# Patient Record
Sex: FEMALE | Race: WHITE | NOT HISPANIC OR LATINO | Employment: OTHER | URBAN - METROPOLITAN AREA
[De-identification: names, ages, dates, MRNs, and addresses within clinical notes are randomized per-mention and may not be internally consistent; named-entity substitution may affect disease eponyms.]

---

## 2017-10-30 RX ORDER — FLUOXETINE 10 MG/1
10 CAPSULE ORAL DAILY
COMMUNITY

## 2017-10-30 RX ORDER — FENOFIBRATE 134 MG/1
134 CAPSULE ORAL
COMMUNITY
End: 2017-10-31

## 2017-10-30 RX ORDER — LEVOTHYROXINE SODIUM 88 UG/1
88 TABLET ORAL DAILY
COMMUNITY

## 2017-10-30 RX ORDER — LISINOPRIL 20 MG/1
20 TABLET ORAL DAILY
COMMUNITY
End: 2018-02-15 | Stop reason: HOSPADM

## 2017-10-30 RX ORDER — INSULIN GLARGINE 100 [IU]/ML
INJECTION, SOLUTION SUBCUTANEOUS DAILY
COMMUNITY
End: 2017-10-31

## 2017-10-30 RX ORDER — GLIMEPIRIDE 4 MG/1
4 TABLET ORAL
COMMUNITY
End: 2019-04-03

## 2017-10-30 RX ORDER — PRAVASTATIN SODIUM 40 MG
40 TABLET ORAL DAILY
COMMUNITY
End: 2018-02-15 | Stop reason: HOSPADM

## 2017-10-30 RX ORDER — METOPROLOL TARTRATE 50 MG/1
50 TABLET, FILM COATED ORAL 2 TIMES DAILY
COMMUNITY
End: 2017-10-31

## 2017-10-31 ENCOUNTER — PREP FOR SURGERY (OUTPATIENT)
Dept: OTHER | Facility: HOSPITAL | Age: 67
End: 2017-10-31

## 2017-10-31 ENCOUNTER — OFFICE VISIT (OUTPATIENT)
Dept: NEUROSURGERY | Facility: CLINIC | Age: 67
End: 2017-10-31

## 2017-10-31 VITALS — TEMPERATURE: 98.5 F | BODY MASS INDEX: 26.84 KG/M2 | HEIGHT: 66 IN | WEIGHT: 167 LBS

## 2017-10-31 DIAGNOSIS — M47.816 FACET ARTHRITIS OF LUMBAR REGION: ICD-10-CM

## 2017-10-31 DIAGNOSIS — M48.062 SPINAL STENOSIS OF LUMBAR REGION WITH NEUROGENIC CLAUDICATION: Primary | ICD-10-CM

## 2017-10-31 DIAGNOSIS — M51.36 BULGING LUMBAR DISC: ICD-10-CM

## 2017-10-31 DIAGNOSIS — M48.062 LUMBAR STENOSIS WITH NEUROGENIC CLAUDICATION: Primary | ICD-10-CM

## 2017-10-31 DIAGNOSIS — M51.36 DEGENERATIVE DISC DISEASE, LUMBAR: ICD-10-CM

## 2017-10-31 PROCEDURE — 99204 OFFICE O/P NEW MOD 45 MIN: CPT | Performed by: NEUROLOGICAL SURGERY

## 2017-10-31 RX ORDER — CHLORHEXIDINE GLUCONATE 4 G/100ML
SOLUTION TOPICAL
Qty: 120 ML | Refills: 0 | Status: ON HOLD | OUTPATIENT
Start: 2017-10-31 | End: 2017-11-24

## 2017-10-31 RX ORDER — EXEMESTANE 25 MG/1
25 TABLET ORAL DAILY
COMMUNITY
Start: 2017-10-12

## 2017-10-31 RX ORDER — AMITRIPTYLINE HYDROCHLORIDE 25 MG/1
25 TABLET, FILM COATED ORAL NIGHTLY
COMMUNITY
Start: 2017-09-26 | End: 2019-04-03

## 2017-10-31 RX ORDER — OXYCODONE HCL 10 MG/1
10 TABLET, FILM COATED, EXTENDED RELEASE ORAL ONCE
Status: CANCELLED | OUTPATIENT
Start: 2017-10-31 | End: 2017-10-31

## 2017-10-31 RX ORDER — SODIUM CHLORIDE 0.9 % (FLUSH) 0.9 %
1-10 SYRINGE (ML) INJECTION AS NEEDED
Status: CANCELLED | OUTPATIENT
Start: 2017-10-31

## 2017-10-31 RX ORDER — NAPROXEN SODIUM 220 MG
220 TABLET ORAL 2 TIMES DAILY PRN
COMMUNITY
End: 2019-04-03

## 2017-10-31 RX ORDER — GABAPENTIN 600 MG/1
600 TABLET ORAL DAILY
COMMUNITY
Start: 2017-09-15 | End: 2017-10-31

## 2017-10-31 RX ORDER — EMPAGLIFLOZIN 25 MG/1
25 TABLET, FILM COATED ORAL DAILY
COMMUNITY
Start: 2017-09-20 | End: 2019-04-03

## 2017-10-31 RX ORDER — ACETAMINOPHEN 500 MG
1000 TABLET ORAL ONCE
Status: CANCELLED | OUTPATIENT
Start: 2017-10-31 | End: 2017-10-31

## 2017-10-31 RX ORDER — FAMOTIDINE 20 MG/1
20 TABLET, FILM COATED ORAL
Status: CANCELLED | OUTPATIENT
Start: 2017-10-31

## 2017-10-31 RX ORDER — CEFAZOLIN SODIUM 2 G/100ML
2 INJECTION, SOLUTION INTRAVENOUS ONCE
Status: CANCELLED | OUTPATIENT
Start: 2017-10-31 | End: 2017-10-31

## 2017-10-31 RX ORDER — IBUPROFEN 800 MG/1
800 TABLET ORAL ONCE
Status: CANCELLED | OUTPATIENT
Start: 2017-10-31 | End: 2017-10-31

## 2017-10-31 RX ORDER — IBUPROFEN 400 MG/1
400 TABLET ORAL DAILY PRN
Status: ON HOLD | COMMUNITY
End: 2018-02-13

## 2017-10-31 NOTE — H&P
Patient: Malissa Child  : 1950     Primary Care Provider: Juan M Nieto MD     Requesting Provider: As above        History     Chief Complaint: Bilateral hip and leg pain with walking and standing intolerance.     History of Present Illness: Ms. Child is a 67-year-old retired recently  woman who is known to my service.  I saw her in  at which time she was noted to have a large right L5-S1 disc herniation.  She improved with nonoperative measures.  She now describes a six-month history of progressive pain involving her hips.  She has no low back pain.  She has pain extending into the top of both feet with there is also numbness.  She's done 5-7 physical therapy sessions.  Her balance is poor.  She's better with sitting or when using a heating pad.  She takes large amounts of Aleve and ibuprofen.  She is worse with walking.  In recent weeks to months she has noted weakness in both of her feet.  She has no bowel or bladder dysfunction.     Review of Systems   Constitutional: Negative for activity change, appetite change, chills, diaphoresis, fatigue, fever and unexpected weight change.   HENT: Negative for congestion, dental problem, drooling, ear discharge, ear pain, facial swelling, hearing loss, mouth sores, nosebleeds, postnasal drip, rhinorrhea, sinus pressure, sneezing, sore throat, tinnitus, trouble swallowing and voice change.    Eyes: Negative for photophobia, pain, discharge, redness, itching and visual disturbance.   Respiratory: Positive for apnea. Negative for cough, choking, chest tightness, shortness of breath, wheezing and stridor.    Cardiovascular: Positive for palpitations. Negative for chest pain and leg swelling.   Gastrointestinal: Negative for abdominal distention, abdominal pain, anal bleeding, blood in stool, constipation, diarrhea, nausea, rectal pain and vomiting.   Endocrine: Positive for polydipsia. Negative for cold intolerance, heat intolerance, polyphagia and  polyuria.   Genitourinary: Negative for decreased urine volume, difficulty urinating, dysuria, enuresis, flank pain, frequency, genital sores, hematuria and urgency.   Musculoskeletal: Positive for back pain and gait problem. Negative for arthralgias, joint swelling, myalgias, neck pain and neck stiffness.   Skin: Negative for color change, pallor, rash and wound.   Allergic/Immunologic: Positive for environmental allergies. Negative for food allergies and immunocompromised state.   Neurological: Positive for numbness. Negative for dizziness, tremors, seizures, syncope, facial asymmetry, speech difficulty, weakness, light-headedness and headaches.   Hematological: Negative for adenopathy. Does not bruise/bleed easily.   Psychiatric/Behavioral: Positive for dysphoric mood. Negative for agitation, behavioral problems, confusion, decreased concentration, hallucinations, self-injury, sleep disturbance and suicidal ideas. The patient is not nervous/anxious and is not hyperactive.          The patient's past medical history, past surgical history, family history, and social history have been reviewed at length in the electronic medical record.     Past Medical History:   Diagnosis Date   • Depression    • Diabetes mellitus    • Disease of thyroid gland    • History of breast cancer    • Hyperlipidemia    • Hypertension      Past Surgical History:   Procedure Laterality Date   • BREAST LUMPECTOMY Left 1995   • KNEE CARTILAGE SURGERY     • MASTECTOMY       Family History   Problem Relation Age of Onset   • Cancer Mother    • Cancer Father    • Cancer Brother      Social History     Social History   • Marital status:      Spouse name: N/A   • Number of children: N/A   • Years of education: N/A     Occupational History   • Not on file.     Social History Main Topics   • Smoking status: Never Smoker   • Smokeless tobacco: Never Used   • Alcohol use Yes   • Drug use: No   • Sexual activity: Not on file     Other Topics  "Concern   • Not on file     Social History Narrative     Allergies   Allergen Reactions   • Cat Hair Extract    • Dust Mite Extract    • Mold Extract [Trichophyton]        Physical Exam:   Temp 98.5 °F (36.9 °C)  Ht 66\" (167.6 cm)  Wt 167 lb (75.8 kg)  BMI 26.95 kg/m2  CONSTITUTIONAL: Patient is well-nourished, pleasant and appears stated age.  CV: Heart regular rate and rhythm without murmur, rub, or gallop.  Pedal pulses are present bilaterally.  PULMONARY: Lungs are clear to ascultation.  MUSCULOSKELETAL:  Straight leg raising is negative.  Jose's Sign is negative.  ROM in back normal.  Tenderness in the back to palpation is not observed.  NEUROLOGICAL:  Orientation, memory, attention span, language function, and cognition have been examined and are intact.  Strength is intact in the lower extremities to direct testing except for dorsiflexion of her feet which are both 4 minus/5.  Muscle tone is normal throughout.  Station and gait are normal except that she is unable to get up on her heels.  Sensation is intact to light touch testing throughout.  Deep tendon reflexes are 1+ and symmetrical.  Coordination is intact.  CRANIAL NERVES:  Cranial Nerve II:   Visual fields are full to confrontation.  Cranial Nerve III, IV, and VI: PERRLADC. Extraocular movements are intact.  Nystagmus is not present.  Cranial Nerve V: Facial sensation is intact to light touch.  Cranial Nerve VII: Muscles of facial expression demonstate no weakness or asymmetry.  Cranial Nerve VIII: Hearing is intact to finger rub bilaterally.  Cranial Nerve IX and X: Palate elevates symmetrically.  Cranial Nerve XI: Shoulder shrug is intact bilaterally.  Cranial Nerve XII: Tongue is midline without evidence of atrophy or fasciculation.     Medical Decision Making     Data Review:   MRI of the lumbar spine dated 10/6/17 demonstrates some mild diffuse degenerative disc disease and facet arthropathy.  There is generous stenosis particularly " involving the recesses at L4-5.     Diagnosis:   The patient harbors lumbar stenosis with neurogenic claudication.  She has bilateral radiculopathies characterized by pain and weakness.     Treatment Options:   Given her pain and neurologic deficit I recommended L4-5 decompressive laminectomy.  This will likely help with her pain and prevent progression of her weakness.  It may or may not correct her current neurologic difficulties.  The nature of the procedure as well as the potential risks, complications, limitations, and alternatives to the procedure were discussed at length with the patient and the patient has agreed to proceed with surgery.          Diagnosis Plan   1. Spinal stenosis of lumbar region with neurogenic claudication      2. Bulging lumbar disc      3. Degenerative disc disease, lumbar      4. Facet arthritis of lumbar region

## 2017-10-31 NOTE — PROGRESS NOTES
Patient: Malissa Child  : 1950    Primary Care Provider: Juan M Nieto MD    Requesting Provider: As above      History    Chief Complaint: Bilateral hip and leg pain with walking and standing intolerance.    History of Present Illness: Ms. Child is a 67-year-old retired recently  woman who is known to my service.  I saw her in  at which time she was noted to have a large right L5-S1 disc herniation.  She improved with nonoperative measures.  She now describes a six-month history of progressive pain involving her hips.  She has no low back pain.  She has pain extending into the top of both feet with there is also numbness.  She's done 5-7 physical therapy sessions.  Her balance is poor.  She's better with sitting or when using a heating pad.  She takes large amounts of Aleve and ibuprofen.  She is worse with walking.  In recent weeks to months she has noted weakness in both of her feet.  She has no bowel or bladder dysfunction.    Review of Systems   Constitutional: Negative for activity change, appetite change, chills, diaphoresis, fatigue, fever and unexpected weight change.   HENT: Negative for congestion, dental problem, drooling, ear discharge, ear pain, facial swelling, hearing loss, mouth sores, nosebleeds, postnasal drip, rhinorrhea, sinus pressure, sneezing, sore throat, tinnitus, trouble swallowing and voice change.    Eyes: Negative for photophobia, pain, discharge, redness, itching and visual disturbance.   Respiratory: Positive for apnea. Negative for cough, choking, chest tightness, shortness of breath, wheezing and stridor.    Cardiovascular: Positive for palpitations. Negative for chest pain and leg swelling.   Gastrointestinal: Negative for abdominal distention, abdominal pain, anal bleeding, blood in stool, constipation, diarrhea, nausea, rectal pain and vomiting.   Endocrine: Positive for polydipsia. Negative for cold intolerance, heat intolerance, polyphagia and  "polyuria.   Genitourinary: Negative for decreased urine volume, difficulty urinating, dysuria, enuresis, flank pain, frequency, genital sores, hematuria and urgency.   Musculoskeletal: Positive for back pain and gait problem. Negative for arthralgias, joint swelling, myalgias, neck pain and neck stiffness.   Skin: Negative for color change, pallor, rash and wound.   Allergic/Immunologic: Positive for environmental allergies. Negative for food allergies and immunocompromised state.   Neurological: Positive for numbness. Negative for dizziness, tremors, seizures, syncope, facial asymmetry, speech difficulty, weakness, light-headedness and headaches.   Hematological: Negative for adenopathy. Does not bruise/bleed easily.   Psychiatric/Behavioral: Positive for dysphoric mood. Negative for agitation, behavioral problems, confusion, decreased concentration, hallucinations, self-injury, sleep disturbance and suicidal ideas. The patient is not nervous/anxious and is not hyperactive.        The patient's past medical history, past surgical history, family history, and social history have been reviewed at length in the electronic medical record.    Physical Exam:   Temp 98.5 °F (36.9 °C)  Ht 66\" (167.6 cm)  Wt 167 lb (75.8 kg)  BMI 26.95 kg/m2  CONSTITUTIONAL: Patient is well-nourished, pleasant and appears stated age.  CV: Heart regular rate and rhythm without murmur, rub, or gallop.  Pedal pulses are present bilaterally.  PULMONARY: Lungs are clear to ascultation.  MUSCULOSKELETAL:  Straight leg raising is negative.  Jose's Sign is negative.  ROM in back normal.  Tenderness in the back to palpation is not observed.  NEUROLOGICAL:  Orientation, memory, attention span, language function, and cognition have been examined and are intact.  Strength is intact in the lower extremities to direct testing except for dorsiflexion of her feet which are both 4 minus/5.  Muscle tone is normal throughout.  Station and gait are " normal except that she is unable to get up on her heels.  Sensation is intact to light touch testing throughout.  Deep tendon reflexes are 1+ and symmetrical.  Coordination is intact.  CRANIAL NERVES:  Cranial Nerve II:   Visual fields are full to confrontation.  Cranial Nerve III, IV, and VI: PERRLADC. Extraocular movements are intact.  Nystagmus is not present.  Cranial Nerve V: Facial sensation is intact to light touch.  Cranial Nerve VII: Muscles of facial expression demonstate no weakness or asymmetry.  Cranial Nerve VIII: Hearing is intact to finger rub bilaterally.  Cranial Nerve IX and X: Palate elevates symmetrically.  Cranial Nerve XI: Shoulder shrug is intact bilaterally.  Cranial Nerve XII: Tongue is midline without evidence of atrophy or fasciculation.    Medical Decision Making    Data Review:   MRI of the lumbar spine dated 10/6/17 demonstrates some mild diffuse degenerative disc disease and facet arthropathy.  There is generous stenosis particularly involving the recesses at L4-5.    Diagnosis:   The patient harbors lumbar stenosis with neurogenic claudication.  She has bilateral radiculopathies characterized by pain and weakness.    Treatment Options:   Given her pain and neurologic deficit I recommended L4-5 decompressive laminectomy.  This will likely help with her pain and prevent progression of her weakness.  It may or may not correct her current neurologic difficulties.  The nature of the procedure as well as the potential risks, complications, limitations, and alternatives to the procedure were discussed at length with the patient and the patient has agreed to proceed with surgery.       Diagnosis Plan   1. Spinal stenosis of lumbar region with neurogenic claudication     2. Bulging lumbar disc     3. Degenerative disc disease, lumbar     4. Facet arthritis of lumbar region         Scribed for Arnold Del Rio MD by Christianne Manning CMA on 10/31/2017 at 12:34 PM    IDr. Del Rio,  personally performed the services described in the documentation, as scribed in my presence, and it is both accurate and complete.

## 2017-11-01 PROBLEM — M48.062 LUMBAR STENOSIS WITH NEUROGENIC CLAUDICATION: Status: ACTIVE | Noted: 2017-11-01

## 2017-11-17 ENCOUNTER — APPOINTMENT (OUTPATIENT)
Dept: PREADMISSION TESTING | Facility: HOSPITAL | Age: 67
End: 2017-11-17

## 2017-11-19 ENCOUNTER — APPOINTMENT (OUTPATIENT)
Dept: PREADMISSION TESTING | Facility: HOSPITAL | Age: 67
End: 2017-11-19

## 2017-11-19 VITALS — BODY MASS INDEX: 26.79 KG/M2 | HEIGHT: 66 IN | WEIGHT: 166.67 LBS

## 2017-11-19 DIAGNOSIS — M48.062 LUMBAR STENOSIS WITH NEUROGENIC CLAUDICATION: ICD-10-CM

## 2017-11-19 LAB
DEPRECATED RDW RBC AUTO: 40.8 FL (ref 37–54)
ERYTHROCYTE [DISTWIDTH] IN BLOOD BY AUTOMATED COUNT: 12.8 % (ref 11.3–14.5)
HBA1C MFR BLD: 11.1 % (ref 4.8–5.6)
HCT VFR BLD AUTO: 46.1 % (ref 34.5–44)
HGB BLD-MCNC: 16.2 G/DL (ref 11.5–15.5)
MCH RBC QN AUTO: 30.6 PG (ref 27–31)
MCHC RBC AUTO-ENTMCNC: 35.1 G/DL (ref 32–36)
MCV RBC AUTO: 87.1 FL (ref 80–99)
PLATELET # BLD AUTO: 363 10*3/MM3 (ref 150–450)
PMV BLD AUTO: 10.5 FL (ref 6–12)
POTASSIUM BLD-SCNC: 4.4 MMOL/L (ref 3.5–5.5)
RBC # BLD AUTO: 5.29 10*6/MM3 (ref 3.89–5.14)
WBC NRBC COR # BLD: 10.57 10*3/MM3 (ref 3.5–10.8)

## 2017-11-19 PROCEDURE — 87081 CULTURE SCREEN ONLY: CPT | Performed by: NEUROLOGICAL SURGERY

## 2017-11-19 PROCEDURE — 85027 COMPLETE CBC AUTOMATED: CPT | Performed by: ANESTHESIOLOGY

## 2017-11-19 PROCEDURE — 84132 ASSAY OF SERUM POTASSIUM: CPT | Performed by: ANESTHESIOLOGY

## 2017-11-19 PROCEDURE — 93005 ELECTROCARDIOGRAM TRACING: CPT

## 2017-11-19 PROCEDURE — 83036 HEMOGLOBIN GLYCOSYLATED A1C: CPT | Performed by: ANESTHESIOLOGY

## 2017-11-19 PROCEDURE — 93010 ELECTROCARDIOGRAM REPORT: CPT | Performed by: INTERNAL MEDICINE

## 2017-11-19 PROCEDURE — 36415 COLL VENOUS BLD VENIPUNCTURE: CPT

## 2017-11-19 NOTE — DISCHARGE INSTRUCTIONS
The following information and instructions were given:    NPO after MN except sips of water with routine prescribed medication (except blood thinner, diabetes, or weight reducing medication) unless otherwise instructed by your physician.  Do not eat, drink, smoke or chew gum after MN the night before surgery. This also includes no mints.    DO NOT shave for two days before your procedure.  Do not wear makeup.      DO NOT wear fingernail polish (gel/regular) and/or acrylic/artificial nails on the day of surgery.   If a patient had recent manicure and would rather not remove polish or artificial nails, then the minimum requirement is that the polish/artificial nails must be removed from the middle finger on each hand.      If patient was having surgery on an upper extremity, then the patient was instructed that fingernail polish/artificial fingernails must be removed for surgery.  NO EXCEPTIONS.      If patient was having surgery on a lower extremity, then the patient was instructed that toenail polish on both extremities must be removed for surgery.  NO EXCEPTIONS.    Remove all jewelry (advised to go to jeweler if unable to remove).  Jewelry especially rings can no longer be taped for surgery.    Leave anything you consider valuable at home.    Leave your suitcase in the car until after your surgery.    Bring the following with you (if applicable)   -picture ID and insurance cards   -Co-pay/deductible required by insurance   -Medications in the original bottles (not a list) including all over-the-counter  medications if not brought to PAT   -Copy of advance directive, living will or power of  documents if not  brought to PAT   -CPAP or BIPAP mask and tubing (do not bring machine)   -Skin prep instructions sheet   -PAT Pass    Education booklet, brochure, handout or binder given to patient.    Pain Control After Surgery handout given to patient.    Respirex use (handout given to patient) and pneumonia  prevention.    Signs and Symptoms of infection.    DVT Prevention stressing the importance of ambulation.    Patient to apply Chlorhexadine wipes to surgical area (as instructed) the night before procedure and the AM of procedure.      Lumbar book given

## 2017-11-21 LAB — MRSA SPEC QL CULT: NORMAL

## 2017-11-24 ENCOUNTER — ANESTHESIA (OUTPATIENT)
Dept: PERIOP | Facility: HOSPITAL | Age: 67
End: 2017-11-24

## 2017-11-24 ENCOUNTER — HOSPITAL ENCOUNTER (OUTPATIENT)
Facility: HOSPITAL | Age: 67
Discharge: HOME | End: 2017-11-25
Attending: NEUROLOGICAL SURGERY | Admitting: NEUROLOGICAL SURGERY

## 2017-11-24 ENCOUNTER — ANESTHESIA EVENT (OUTPATIENT)
Dept: PERIOP | Facility: HOSPITAL | Age: 67
End: 2017-11-24

## 2017-11-24 ENCOUNTER — APPOINTMENT (OUTPATIENT)
Dept: GENERAL RADIOLOGY | Facility: HOSPITAL | Age: 67
End: 2017-11-24

## 2017-11-24 DIAGNOSIS — M48.062 LUMBAR STENOSIS WITH NEUROGENIC CLAUDICATION: ICD-10-CM

## 2017-11-24 LAB
GLUCOSE BLDC GLUCOMTR-MCNC: 254 MG/DL (ref 70–130)
GLUCOSE BLDC GLUCOMTR-MCNC: 256 MG/DL (ref 70–130)
POTASSIUM BLDA-SCNC: 4.09 MMOL/L (ref 3.5–5.3)

## 2017-11-24 PROCEDURE — 25010000002 ONDANSETRON PER 1 MG: Performed by: NEUROLOGICAL SURGERY

## 2017-11-24 PROCEDURE — A9270 NON-COVERED ITEM OR SERVICE: HCPCS | Performed by: ANESTHESIOLOGY

## 2017-11-24 PROCEDURE — 63710000001 METFORMIN 1000 MG TABLET: Performed by: NEUROLOGICAL SURGERY

## 2017-11-24 PROCEDURE — 25010000002 FENTANYL CITRATE (PF) 100 MCG/2ML SOLUTION: Performed by: NURSE ANESTHETIST, CERTIFIED REGISTERED

## 2017-11-24 PROCEDURE — A9270 NON-COVERED ITEM OR SERVICE: HCPCS | Performed by: NEUROLOGICAL SURGERY

## 2017-11-24 PROCEDURE — 63710000001 LISINOPRIL 20 MG TABLET: Performed by: NEUROLOGICAL SURGERY

## 2017-11-24 PROCEDURE — 25010000002 DEXAMETHASONE PER 1 MG: Performed by: NURSE ANESTHETIST, CERTIFIED REGISTERED

## 2017-11-24 PROCEDURE — 76000 FLUOROSCOPY <1 HR PHYS/QHP: CPT

## 2017-11-24 PROCEDURE — 84132 ASSAY OF SERUM POTASSIUM: CPT | Performed by: ANESTHESIOLOGY

## 2017-11-24 PROCEDURE — 25010000002 HYDROMORPHONE PER 4 MG: Performed by: NURSE ANESTHETIST, CERTIFIED REGISTERED

## 2017-11-24 PROCEDURE — 25010000003 CEFAZOLIN IN DEXTROSE 2-4 GM/100ML-% SOLUTION: Performed by: NEUROLOGICAL SURGERY

## 2017-11-24 PROCEDURE — 63710000001 FLUOXETINE 10 MG CAPSULE: Performed by: NEUROLOGICAL SURGERY

## 2017-11-24 PROCEDURE — 25010000002 PROPOFOL 10 MG/ML EMULSION: Performed by: NURSE ANESTHETIST, CERTIFIED REGISTERED

## 2017-11-24 PROCEDURE — 63710000001 INSULIN LISPRO (HUMAN) PER 5 UNITS

## 2017-11-24 PROCEDURE — 63710000001 ATORVASTATIN 10 MG TABLET: Performed by: NEUROLOGICAL SURGERY

## 2017-11-24 PROCEDURE — 63710000001 HYDROCODONE-ACETAMINOPHEN 7.5-325 MG TABLET: Performed by: NEUROLOGICAL SURGERY

## 2017-11-24 PROCEDURE — 82962 GLUCOSE BLOOD TEST: CPT

## 2017-11-24 PROCEDURE — 25010000002 PHENYLEPHRINE PER 1 ML: Performed by: NURSE ANESTHETIST, CERTIFIED REGISTERED

## 2017-11-24 PROCEDURE — A9270 NON-COVERED ITEM OR SERVICE: HCPCS

## 2017-11-24 PROCEDURE — 25010000002 NEOSTIGMINE 10 MG/10ML SOLUTION: Performed by: NURSE ANESTHETIST, CERTIFIED REGISTERED

## 2017-11-24 PROCEDURE — 63710000001 IBUPROFEN 800 MG TABLET: Performed by: NEUROLOGICAL SURGERY

## 2017-11-24 PROCEDURE — 63047 LAM FACETEC & FORAMOT LUMBAR: CPT | Performed by: NEUROLOGICAL SURGERY

## 2017-11-24 PROCEDURE — 63710000001 FAMOTIDINE 20 MG TABLET: Performed by: ANESTHESIOLOGY

## 2017-11-24 PROCEDURE — 63710000001 OXYCODONE 10 MG TABLET EXTENDED-RELEASE 12 HOUR: Performed by: NEUROLOGICAL SURGERY

## 2017-11-24 PROCEDURE — 25010000002 ONDANSETRON PER 1 MG: Performed by: NURSE ANESTHETIST, CERTIFIED REGISTERED

## 2017-11-24 PROCEDURE — 63710000001 ACETAMINOPHEN 500 MG TABLET: Performed by: NEUROLOGICAL SURGERY

## 2017-11-24 PROCEDURE — 63710000001 AMITRIPTYLINE 25 MG TABLET: Performed by: NEUROLOGICAL SURGERY

## 2017-11-24 RX ORDER — FENTANYL CITRATE 50 UG/ML
50 INJECTION, SOLUTION INTRAMUSCULAR; INTRAVENOUS
Status: DISCONTINUED | OUTPATIENT
Start: 2017-11-24 | End: 2017-11-24 | Stop reason: HOSPADM

## 2017-11-24 RX ORDER — LIDOCAINE HYDROCHLORIDE 10 MG/ML
0.5 INJECTION, SOLUTION EPIDURAL; INFILTRATION; INTRACAUDAL; PERINEURAL ONCE AS NEEDED
Status: COMPLETED | OUTPATIENT
Start: 2017-11-24 | End: 2017-11-24

## 2017-11-24 RX ORDER — SODIUM CHLORIDE 0.9 % (FLUSH) 0.9 %
1-10 SYRINGE (ML) INJECTION AS NEEDED
Status: DISCONTINUED | OUTPATIENT
Start: 2017-11-24 | End: 2017-11-24 | Stop reason: HOSPADM

## 2017-11-24 RX ORDER — DOCUSATE SODIUM 100 MG/1
100 CAPSULE, LIQUID FILLED ORAL 2 TIMES DAILY PRN
Status: DISCONTINUED | OUTPATIENT
Start: 2017-11-24 | End: 2017-11-25 | Stop reason: HOSPADM

## 2017-11-24 RX ORDER — CEFAZOLIN SODIUM 2 G/100ML
2 INJECTION, SOLUTION INTRAVENOUS EVERY 8 HOURS
Status: COMPLETED | OUTPATIENT
Start: 2017-11-24 | End: 2017-11-25

## 2017-11-24 RX ORDER — HYDROMORPHONE HYDROCHLORIDE 1 MG/ML
0.5 INJECTION, SOLUTION INTRAMUSCULAR; INTRAVENOUS; SUBCUTANEOUS
Status: DISCONTINUED | OUTPATIENT
Start: 2017-11-24 | End: 2017-11-24 | Stop reason: HOSPADM

## 2017-11-24 RX ORDER — BUPIVACAINE HYDROCHLORIDE AND EPINEPHRINE 2.5; 5 MG/ML; UG/ML
INJECTION, SOLUTION EPIDURAL; INFILTRATION; INTRACAUDAL; PERINEURAL AS NEEDED
Status: DISCONTINUED | OUTPATIENT
Start: 2017-11-24 | End: 2017-11-24 | Stop reason: HOSPADM

## 2017-11-24 RX ORDER — PROMETHAZINE HYDROCHLORIDE 25 MG/ML
12.5 INJECTION, SOLUTION INTRAMUSCULAR; INTRAVENOUS ONCE AS NEEDED
Status: DISCONTINUED | OUTPATIENT
Start: 2017-11-24 | End: 2017-11-24 | Stop reason: HOSPADM

## 2017-11-24 RX ORDER — OXYCODONE HCL 10 MG/1
10 TABLET, FILM COATED, EXTENDED RELEASE ORAL ONCE
Status: COMPLETED | OUTPATIENT
Start: 2017-11-24 | End: 2017-11-24

## 2017-11-24 RX ORDER — SODIUM CHLORIDE 0.9 % (FLUSH) 0.9 %
1-10 SYRINGE (ML) INJECTION AS NEEDED
Status: DISCONTINUED | OUTPATIENT
Start: 2017-11-24 | End: 2017-11-25 | Stop reason: HOSPADM

## 2017-11-24 RX ORDER — HYDROCODONE BITARTRATE AND ACETAMINOPHEN 7.5; 325 MG/1; MG/1
1 TABLET ORAL EVERY 4 HOURS PRN
Status: DISCONTINUED | OUTPATIENT
Start: 2017-11-24 | End: 2017-11-25 | Stop reason: HOSPADM

## 2017-11-24 RX ORDER — IBUPROFEN 400 MG/1
400 TABLET ORAL DAILY PRN
Status: DISCONTINUED | OUTPATIENT
Start: 2017-11-24 | End: 2017-11-25 | Stop reason: HOSPADM

## 2017-11-24 RX ORDER — LISINOPRIL 20 MG/1
20 TABLET ORAL DAILY
Status: DISCONTINUED | OUTPATIENT
Start: 2017-11-24 | End: 2017-11-25 | Stop reason: HOSPADM

## 2017-11-24 RX ORDER — LEVOTHYROXINE SODIUM 88 UG/1
88 TABLET ORAL
Status: DISCONTINUED | OUTPATIENT
Start: 2017-11-25 | End: 2017-11-25 | Stop reason: HOSPADM

## 2017-11-24 RX ORDER — GLIPIZIDE 5 MG/1
2.5 TABLET ORAL
Status: DISCONTINUED | OUTPATIENT
Start: 2017-11-25 | End: 2017-11-25 | Stop reason: HOSPADM

## 2017-11-24 RX ORDER — FLUOXETINE 10 MG/1
10 CAPSULE ORAL DAILY
Status: DISCONTINUED | OUTPATIENT
Start: 2017-11-24 | End: 2017-11-25 | Stop reason: HOSPADM

## 2017-11-24 RX ORDER — DIPHENHYDRAMINE HCL 25 MG
25 CAPSULE ORAL NIGHTLY PRN
Status: DISCONTINUED | OUTPATIENT
Start: 2017-11-24 | End: 2017-11-25 | Stop reason: HOSPADM

## 2017-11-24 RX ORDER — MORPHINE SULFATE 4 MG/ML
2 INJECTION, SOLUTION INTRAMUSCULAR; INTRAVENOUS EVERY 4 HOURS PRN
Status: DISCONTINUED | OUTPATIENT
Start: 2017-11-24 | End: 2017-11-25 | Stop reason: HOSPADM

## 2017-11-24 RX ORDER — MAGNESIUM HYDROXIDE 1200 MG/15ML
LIQUID ORAL AS NEEDED
Status: DISCONTINUED | OUTPATIENT
Start: 2017-11-24 | End: 2017-11-24 | Stop reason: HOSPADM

## 2017-11-24 RX ORDER — CEFAZOLIN SODIUM 2 G/100ML
2 INJECTION, SOLUTION INTRAVENOUS ONCE
Status: COMPLETED | OUTPATIENT
Start: 2017-11-24 | End: 2017-11-24

## 2017-11-24 RX ORDER — MORPHINE SULFATE 4 MG/ML
4 INJECTION, SOLUTION INTRAMUSCULAR; INTRAVENOUS EVERY 4 HOURS PRN
Status: DISCONTINUED | OUTPATIENT
Start: 2017-11-24 | End: 2017-11-25 | Stop reason: HOSPADM

## 2017-11-24 RX ORDER — SODIUM CHLORIDE, SODIUM LACTATE, POTASSIUM CHLORIDE, CALCIUM CHLORIDE 600; 310; 30; 20 MG/100ML; MG/100ML; MG/100ML; MG/100ML
90 INJECTION, SOLUTION INTRAVENOUS CONTINUOUS
Status: DISCONTINUED | OUTPATIENT
Start: 2017-11-24 | End: 2017-11-25

## 2017-11-24 RX ORDER — SENNA AND DOCUSATE SODIUM 50; 8.6 MG/1; MG/1
1 TABLET, FILM COATED ORAL NIGHTLY PRN
Status: DISCONTINUED | OUTPATIENT
Start: 2017-11-24 | End: 2017-11-25 | Stop reason: HOSPADM

## 2017-11-24 RX ORDER — DEXAMETHASONE SODIUM PHOSPHATE 4 MG/ML
INJECTION, SOLUTION INTRA-ARTICULAR; INTRALESIONAL; INTRAMUSCULAR; INTRAVENOUS; SOFT TISSUE AS NEEDED
Status: DISCONTINUED | OUTPATIENT
Start: 2017-11-24 | End: 2017-11-24 | Stop reason: SURG

## 2017-11-24 RX ORDER — FAMOTIDINE 20 MG/1
20 TABLET, FILM COATED ORAL ONCE
Status: COMPLETED | OUTPATIENT
Start: 2017-11-24 | End: 2017-11-24

## 2017-11-24 RX ORDER — ACETAMINOPHEN 325 MG/1
650 TABLET ORAL EVERY 4 HOURS PRN
Status: DISCONTINUED | OUTPATIENT
Start: 2017-11-24 | End: 2017-11-25 | Stop reason: HOSPADM

## 2017-11-24 RX ORDER — PROMETHAZINE HYDROCHLORIDE 25 MG/1
25 SUPPOSITORY RECTAL ONCE AS NEEDED
Status: DISCONTINUED | OUTPATIENT
Start: 2017-11-24 | End: 2017-11-24 | Stop reason: HOSPADM

## 2017-11-24 RX ORDER — IBUPROFEN 800 MG/1
800 TABLET ORAL ONCE
Status: COMPLETED | OUTPATIENT
Start: 2017-11-24 | End: 2017-11-24

## 2017-11-24 RX ORDER — ACETAMINOPHEN 500 MG
1000 TABLET ORAL ONCE
Status: COMPLETED | OUTPATIENT
Start: 2017-11-24 | End: 2017-11-24

## 2017-11-24 RX ORDER — NALOXONE HCL 0.4 MG/ML
0.4 VIAL (ML) INJECTION
Status: DISCONTINUED | OUTPATIENT
Start: 2017-11-24 | End: 2017-11-25 | Stop reason: HOSPADM

## 2017-11-24 RX ORDER — ONDANSETRON 2 MG/ML
4 INJECTION INTRAMUSCULAR; INTRAVENOUS ONCE AS NEEDED
Status: DISCONTINUED | OUTPATIENT
Start: 2017-11-24 | End: 2017-11-24 | Stop reason: HOSPADM

## 2017-11-24 RX ORDER — ATRACURIUM BESYLATE 10 MG/ML
INJECTION, SOLUTION INTRAVENOUS AS NEEDED
Status: DISCONTINUED | OUTPATIENT
Start: 2017-11-24 | End: 2017-11-24 | Stop reason: SURG

## 2017-11-24 RX ORDER — ONDANSETRON 2 MG/ML
4 INJECTION INTRAMUSCULAR; INTRAVENOUS EVERY 6 HOURS PRN
Status: DISCONTINUED | OUTPATIENT
Start: 2017-11-24 | End: 2017-11-25 | Stop reason: HOSPADM

## 2017-11-24 RX ORDER — NEOSTIGMINE METHYLSULFATE 1 MG/ML
INJECTION, SOLUTION INTRAVENOUS AS NEEDED
Status: DISCONTINUED | OUTPATIENT
Start: 2017-11-24 | End: 2017-11-24 | Stop reason: SURG

## 2017-11-24 RX ORDER — AMITRIPTYLINE HYDROCHLORIDE 25 MG/1
25 TABLET, FILM COATED ORAL NIGHTLY
Status: DISCONTINUED | OUTPATIENT
Start: 2017-11-24 | End: 2017-11-25 | Stop reason: HOSPADM

## 2017-11-24 RX ORDER — FENTANYL CITRATE 50 UG/ML
INJECTION, SOLUTION INTRAMUSCULAR; INTRAVENOUS AS NEEDED
Status: DISCONTINUED | OUTPATIENT
Start: 2017-11-24 | End: 2017-11-24 | Stop reason: SURG

## 2017-11-24 RX ORDER — LIDOCAINE HYDROCHLORIDE 10 MG/ML
INJECTION, SOLUTION EPIDURAL; INFILTRATION; INTRACAUDAL; PERINEURAL AS NEEDED
Status: DISCONTINUED | OUTPATIENT
Start: 2017-11-24 | End: 2017-11-24 | Stop reason: SURG

## 2017-11-24 RX ORDER — BISACODYL 5 MG/1
5 TABLET, DELAYED RELEASE ORAL DAILY PRN
Status: DISCONTINUED | OUTPATIENT
Start: 2017-11-24 | End: 2017-11-25 | Stop reason: HOSPADM

## 2017-11-24 RX ORDER — GLYCOPYRROLATE 0.2 MG/ML
INJECTION INTRAMUSCULAR; INTRAVENOUS AS NEEDED
Status: DISCONTINUED | OUTPATIENT
Start: 2017-11-24 | End: 2017-11-24 | Stop reason: SURG

## 2017-11-24 RX ORDER — ATORVASTATIN CALCIUM 10 MG/1
10 TABLET, FILM COATED ORAL NIGHTLY
Status: DISCONTINUED | OUTPATIENT
Start: 2017-11-24 | End: 2017-11-25 | Stop reason: HOSPADM

## 2017-11-24 RX ORDER — FAMOTIDINE 10 MG/ML
20 INJECTION, SOLUTION INTRAVENOUS ONCE
Status: CANCELLED | OUTPATIENT
Start: 2017-11-24 | End: 2017-11-24

## 2017-11-24 RX ORDER — PROMETHAZINE HYDROCHLORIDE 25 MG/1
25 TABLET ORAL ONCE AS NEEDED
Status: DISCONTINUED | OUTPATIENT
Start: 2017-11-24 | End: 2017-11-24 | Stop reason: HOSPADM

## 2017-11-24 RX ORDER — BISACODYL 10 MG
10 SUPPOSITORY, RECTAL RECTAL DAILY PRN
Status: DISCONTINUED | OUTPATIENT
Start: 2017-11-24 | End: 2017-11-25 | Stop reason: HOSPADM

## 2017-11-24 RX ORDER — ONDANSETRON 2 MG/ML
INJECTION INTRAMUSCULAR; INTRAVENOUS AS NEEDED
Status: DISCONTINUED | OUTPATIENT
Start: 2017-11-24 | End: 2017-11-24 | Stop reason: SURG

## 2017-11-24 RX ORDER — PROPOFOL 10 MG/ML
VIAL (ML) INTRAVENOUS AS NEEDED
Status: DISCONTINUED | OUTPATIENT
Start: 2017-11-24 | End: 2017-11-24 | Stop reason: SURG

## 2017-11-24 RX ORDER — SODIUM CHLORIDE, SODIUM LACTATE, POTASSIUM CHLORIDE, CALCIUM CHLORIDE 600; 310; 30; 20 MG/100ML; MG/100ML; MG/100ML; MG/100ML
9 INJECTION, SOLUTION INTRAVENOUS CONTINUOUS
Status: DISCONTINUED | OUTPATIENT
Start: 2017-11-24 | End: 2017-11-25 | Stop reason: HOSPADM

## 2017-11-24 RX ADMIN — LISINOPRIL 20 MG: 20 TABLET ORAL at 17:44

## 2017-11-24 RX ADMIN — CEFAZOLIN SODIUM 2 G: 2 INJECTION, SOLUTION INTRAVENOUS at 11:21

## 2017-11-24 RX ADMIN — ONDANSETRON 4 MG: 2 INJECTION INTRAMUSCULAR; INTRAVENOUS at 12:11

## 2017-11-24 RX ADMIN — PROPOFOL 160 MG: 10 INJECTION, EMULSION INTRAVENOUS at 11:29

## 2017-11-24 RX ADMIN — IBUPROFEN 800 MG: 800 TABLET ORAL at 10:45

## 2017-11-24 RX ADMIN — LIDOCAINE HYDROCHLORIDE 0.2 ML: 10 INJECTION, SOLUTION EPIDURAL; INFILTRATION; INTRACAUDAL; PERINEURAL at 10:32

## 2017-11-24 RX ADMIN — NEOSTIGMINE METHYLSULFATE 3.5 MG: 1 INJECTION, SOLUTION INTRAVENOUS at 12:19

## 2017-11-24 RX ADMIN — SODIUM CHLORIDE, POTASSIUM CHLORIDE, SODIUM LACTATE AND CALCIUM CHLORIDE 9 ML/HR: 600; 310; 30; 20 INJECTION, SOLUTION INTRAVENOUS at 10:32

## 2017-11-24 RX ADMIN — OXYCODONE HYDROCHLORIDE 10 MG: 10 TABLET, FILM COATED, EXTENDED RELEASE ORAL at 10:45

## 2017-11-24 RX ADMIN — AMITRIPTYLINE HYDROCHLORIDE 25 MG: 25 TABLET, FILM COATED ORAL at 20:34

## 2017-11-24 RX ADMIN — FLUOXETINE 10 MG: 10 CAPSULE ORAL at 17:45

## 2017-11-24 RX ADMIN — LIDOCAINE HYDROCHLORIDE 50 MG: 10 INJECTION, SOLUTION EPIDURAL; INFILTRATION; INTRACAUDAL; PERINEURAL at 11:29

## 2017-11-24 RX ADMIN — FENTANYL CITRATE 150 MCG: 50 INJECTION, SOLUTION INTRAMUSCULAR; INTRAVENOUS at 11:29

## 2017-11-24 RX ADMIN — HYDROMORPHONE HYDROCHLORIDE 0.5 MG: 1 INJECTION, SOLUTION INTRAMUSCULAR; INTRAVENOUS; SUBCUTANEOUS at 13:25

## 2017-11-24 RX ADMIN — DEXAMETHASONE SODIUM PHOSPHATE 8 MG: 4 INJECTION, SOLUTION INTRAMUSCULAR; INTRAVENOUS at 11:35

## 2017-11-24 RX ADMIN — FENTANYL CITRATE 50 MCG: 50 INJECTION, SOLUTION INTRAMUSCULAR; INTRAVENOUS at 11:35

## 2017-11-24 RX ADMIN — HYDROCODONE BITARTRATE AND ACETAMINOPHEN 1 TABLET: 7.5; 325 TABLET ORAL at 20:34

## 2017-11-24 RX ADMIN — CEFAZOLIN SODIUM 2 G: 2 INJECTION, SOLUTION INTRAVENOUS at 20:34

## 2017-11-24 RX ADMIN — HYDROMORPHONE HYDROCHLORIDE 0.5 MG: 1 INJECTION, SOLUTION INTRAMUSCULAR; INTRAVENOUS; SUBCUTANEOUS at 12:55

## 2017-11-24 RX ADMIN — ONDANSETRON 4 MG: 2 INJECTION INTRAMUSCULAR; INTRAVENOUS at 18:17

## 2017-11-24 RX ADMIN — METFORMIN HYDROCHLORIDE 1000 MG: 1000 TABLET, FILM COATED ORAL at 17:45

## 2017-11-24 RX ADMIN — PHENYLEPHRINE HYDROCHLORIDE 100 MCG: 10 INJECTION INTRAVENOUS at 12:05

## 2017-11-24 RX ADMIN — EPHEDRINE SULFATE 10 MG: 50 INJECTION INTRAMUSCULAR; INTRAVENOUS; SUBCUTANEOUS at 12:02

## 2017-11-24 RX ADMIN — ATRACURIUM BESYLATE 50 MG: 10 INJECTION, SOLUTION INTRAVENOUS at 11:29

## 2017-11-24 RX ADMIN — ATORVASTATIN CALCIUM 10 MG: 10 TABLET, FILM COATED ORAL at 20:34

## 2017-11-24 RX ADMIN — HYDROMORPHONE HYDROCHLORIDE 0.5 MG: 1 INJECTION, SOLUTION INTRAMUSCULAR; INTRAVENOUS; SUBCUTANEOUS at 13:44

## 2017-11-24 RX ADMIN — FAMOTIDINE 20 MG: 20 TABLET, FILM COATED ORAL at 10:45

## 2017-11-24 RX ADMIN — HYDROCODONE BITARTRATE AND ACETAMINOPHEN 1 TABLET: 7.5; 325 TABLET ORAL at 14:51

## 2017-11-24 RX ADMIN — GLYCOPYRROLATE 0.4 MG: 0.2 INJECTION, SOLUTION INTRAMUSCULAR; INTRAVENOUS at 12:19

## 2017-11-24 RX ADMIN — EPHEDRINE SULFATE 15 MG: 50 INJECTION INTRAMUSCULAR; INTRAVENOUS; SUBCUTANEOUS at 11:55

## 2017-11-24 RX ADMIN — ACETAMINOPHEN 1000 MG: 500 TABLET ORAL at 10:45

## 2017-11-24 RX ADMIN — SODIUM CHLORIDE, POTASSIUM CHLORIDE, SODIUM LACTATE AND CALCIUM CHLORIDE 90 ML/HR: 600; 310; 30; 20 INJECTION, SOLUTION INTRAVENOUS at 18:39

## 2017-11-24 RX ADMIN — SODIUM CHLORIDE, POTASSIUM CHLORIDE, SODIUM LACTATE AND CALCIUM CHLORIDE: 600; 310; 30; 20 INJECTION, SOLUTION INTRAVENOUS at 11:21

## 2017-11-24 NOTE — ANESTHESIA POSTPROCEDURE EVALUATION
Patient: Malissa Danyell    Procedure Summary     Date Anesthesia Start Anesthesia Stop Room / Location    11/24/17 1121   [facility] / [facility]       Procedure Diagnosis Surgeon Provider    LUMBAR LAMINECTOMY L4-5 (N/A Spine Lumbar) Lumbar stenosis with neurogenic claudication  (Lumbar stenosis with neurogenic claudication [M48.062]) MD Kenny Hollis MD          Anesthesia Type: general  Last vitals  /85    98.5   Pulse   101   Resp   18   SpO2   98     Anesthesia Post Evaluation

## 2017-11-24 NOTE — OP NOTE
NEUROSURGICAL OPERATIVE NOTE        PREOPERATIVE DIAGNOSIS:    L4-5 stenosis  Neurogenic claudication      POSTOPERATIVE DIAGNOSIS:  Same      PROCEDURE:  L4-5 laminectomy with medial facetectomies and foraminotomies      SURGEON:  Arnold Del Rio M.D.      ASSISTANT:  Eileen Carson PA-C      ANESTHESIA:  General      ESTIMATED BLOOD LOSS:  Less than 25 mL      SPECIMEN:  None      DRAINS:  7 flat NAA      COMPLICATIONS:  None      CLINICAL NOTE:  The patient is a 67-year-old woman with a history of back and bilateral lower extremity pain with walking and standing intolerance.  Studies demonstrate generous stenosis at L4-5.  As such, she presents at this time for decompressive laminectomy.  The nature of the procedure as well as the potential risks, complications, limitations, and alternatives to the procedure were discussed at length with the patient and the patient has agreed to proceed with surgery.    TECHNICAL NOTE:  The patient was brought to the operating room and while on her cart she underwent general endotracheal was achieved. She was then turned prone onto the Cloward saddle frame. Special care was ensured to protect pressure points. Her low back was prepared and draped in usual fashion. A localizing radiograph was obtained using the C-arm with a spinal needle in the lumbosacral midline. Based on this a several centimeter vertical incision was fashioned. Underlying tissues were divided with cautery to provide exposure to the posterior spinal elements at L4-L5. Another radiographic confirmed the operative level. Leksell rongeur was utilized to remove the spinous process at L4 and the upper aspect of L5. The drill was utilized to pass the central trough which was then widened using drill and Kerrison punches. There was dramatic ligamentous overgrowth, not as much bony overgrowth. All of this was resected. At completion of the procedure, the dural sac was well decompressed and a blunt ball probe could easily  be passed along the L4 and L5 nerve roots. Bleeding points were controlled with bone wax and with Floseal. With a Valsalva maneuver at the end of procedure there was no significant bleeding, nor evidence of CSF leak. The wound was washed out with an antibiotic-containing solution. The paraspinous muscle and fascia was then reapproximated in interrupted fashion with 0 Vicryl suture. Then, 0.25% Marcaine was instilled in the paraspinous musculature and subcutaneous tissue. Subcutaneous tissues were closed in layers with 3-0 Vicryl suture. The skin was closed in a running subcuticular fashion with Stratafix. A Prineo dressing was applied. The patient was subsequently rolled onto her cart, extubated, and taken to recovery room in satisfactory condition. There were no intraoperative complications and she did receive preoperative antibiotics.            Arnold Del Rio M.D.

## 2017-11-24 NOTE — INTERVAL H&P NOTE
Pre-Op H&P (See Recent Office Note Attached)    Chief complaint: Bilateral hip and leg pain with walking and standing intolerance    Review of Systems:  General ROS:  no fever, chills, rashes, No change since last office visit  Cardiovascular ROS: no chest pain or dyspnea on exertion  Respiratory ROS: no cough, shortness of breath, or wheezing  Skin:  Ulceration close to right elbow with no drainage, occ warmth, edema, erythema.  Improved.    Immunization History:   Influenza:  Yes 2017  Pneumococcal:  no  Tetanus:  unknown    Vital Signs:  /98 (BP Location: Right arm, Patient Position: Lying)  Pulse 102  Temp 97.1 °F (36.2 °C) (Temporal Artery )   Resp 16  SpO2 98%  Breastfeeding? No    Physical Exam:    CV:  S1S2 regular rate and rhythm, no murmur               Resp:  Clear to auscultation; respirations regular, even and unlabored               Skin:  Right elbow area ulceration with mild edema, minimal warmth and erythema.  No drainage/crepitus    Results Review:    I reviewed the patient's new clinical results.    Cancer Staging (if applicable)  Cancer Patient: __ yes _x_no __unknown; If yes, clinical stage T:__ N:__M:__, stage group or __N/A    Kadi Shaw, APRN  11/24/2017   11:13 AM

## 2017-11-25 VITALS
TEMPERATURE: 97.4 F | BODY MASS INDEX: 26.78 KG/M2 | OXYGEN SATURATION: 96 % | DIASTOLIC BLOOD PRESSURE: 70 MMHG | SYSTOLIC BLOOD PRESSURE: 145 MMHG | RESPIRATION RATE: 20 BRPM | HEIGHT: 66 IN | HEART RATE: 92 BPM | WEIGHT: 166.63 LBS

## 2017-11-25 PROBLEM — M48.062 LUMBAR STENOSIS WITH NEUROGENIC CLAUDICATION: Status: ACTIVE | Noted: 2017-11-01

## 2017-11-25 PROCEDURE — 25010000003 CEFAZOLIN IN DEXTROSE 2-4 GM/100ML-% SOLUTION: Performed by: NEUROLOGICAL SURGERY

## 2017-11-25 PROCEDURE — A9270 NON-COVERED ITEM OR SERVICE: HCPCS | Performed by: NEUROLOGICAL SURGERY

## 2017-11-25 PROCEDURE — 63710000001 FLUOXETINE 10 MG CAPSULE: Performed by: NEUROLOGICAL SURGERY

## 2017-11-25 PROCEDURE — 63710000001 METFORMIN 1000 MG TABLET: Performed by: NEUROLOGICAL SURGERY

## 2017-11-25 PROCEDURE — 63710000001 LEVOTHYROXINE 88 MCG TABLET: Performed by: NEUROLOGICAL SURGERY

## 2017-11-25 PROCEDURE — 63710000001 LISINOPRIL 20 MG TABLET: Performed by: NEUROLOGICAL SURGERY

## 2017-11-25 PROCEDURE — 63710000001 GLIPIZIDE 5 MG TABLET: Performed by: NEUROLOGICAL SURGERY

## 2017-11-25 PROCEDURE — 63710000001 DOCUSATE SODIUM 100 MG CAPSULE: Performed by: NEUROLOGICAL SURGERY

## 2017-11-25 PROCEDURE — 63710000001 HYDROCODONE-ACETAMINOPHEN 7.5-325 MG TABLET: Performed by: NEUROLOGICAL SURGERY

## 2017-11-25 PROCEDURE — 63710000001 IBUPROFEN 400 MG TABLET: Performed by: NEUROLOGICAL SURGERY

## 2017-11-25 RX ORDER — HYDROCODONE BITARTRATE AND ACETAMINOPHEN 7.5; 325 MG/1; MG/1
1 TABLET ORAL 3 TIMES DAILY PRN
Qty: 50 TABLET | Refills: 0 | Status: SHIPPED | OUTPATIENT
Start: 2017-11-25 | End: 2018-01-26

## 2017-11-25 RX ADMIN — GLIPIZIDE 2.5 MG: 5 TABLET ORAL at 07:50

## 2017-11-25 RX ADMIN — FLUOXETINE 10 MG: 10 CAPSULE ORAL at 07:52

## 2017-11-25 RX ADMIN — LEVOTHYROXINE SODIUM 88 MCG: 88 TABLET ORAL at 05:43

## 2017-11-25 RX ADMIN — DOCUSATE SODIUM 100 MG: 100 CAPSULE, LIQUID FILLED ORAL at 07:53

## 2017-11-25 RX ADMIN — HYDROCODONE BITARTRATE AND ACETAMINOPHEN 1 TABLET: 7.5; 325 TABLET ORAL at 07:52

## 2017-11-25 RX ADMIN — HYDROCODONE BITARTRATE AND ACETAMINOPHEN 1 TABLET: 7.5; 325 TABLET ORAL at 13:07

## 2017-11-25 RX ADMIN — LISINOPRIL 20 MG: 20 TABLET ORAL at 07:52

## 2017-11-25 RX ADMIN — IBUPROFEN 400 MG: 400 TABLET ORAL at 07:51

## 2017-11-25 RX ADMIN — METFORMIN HYDROCHLORIDE 1000 MG: 1000 TABLET, FILM COATED ORAL at 07:53

## 2017-11-25 RX ADMIN — CEFAZOLIN SODIUM 2 G: 2 INJECTION, SOLUTION INTRAVENOUS at 04:11

## 2017-11-25 NOTE — PLAN OF CARE
Problem: Laminectomy/Foraminotomy/Discectomy (Adult)  Goal: Signs and Symptoms of Listed Potential Problems Will be Absent or Manageable (Laminectomy/Foraminotomy/Discectomy)  Outcome: Ongoing (interventions implemented as appropriate)  Patient is learning how to be independent with new mobility limitations

## 2017-11-25 NOTE — DISCHARGE INSTR - APPOINTMENTS
Follow up with Dr. Del Rio's physician assistant in 3 weeks. Call on Monday as the office is closed on the weekends.

## 2017-11-25 NOTE — PROGRESS NOTES
Continued Stay Note   [place]     Patient Name: Malissa Child  MRN: [id number]  Today's Date: 11/25/2017    Admit Date: 11/24/2017          Discharge Plan       11/25/17 [facility]    Case Management/Social Work Plan    Plan Home    Patient/Family In Agreement With Plan yes    Additional Comments Spoke with patient at bedside regarding discharge plan.  Patient requesting bedside commode to go over her toilet at home due to how low it is.  Patient request Rolling Walker for stability.   [facility].  Called [facility] and spoke with Christy who will have DME delivered to patients room prior to discharge.  Patient verbalizes no other discharge needs at this time.  Patient plans to discharge home today via car with family to transport.                Discharge Codes     None        Expected Discharge Date and Time     Expected Discharge Date Expected Discharge Time    Nov 25, 2017             Yusra Vega, RN

## 2017-11-25 NOTE — PLAN OF CARE
Problem: Patient Care Overview (Adult)  Goal: Plan of Care Review  Outcome: Ongoing (interventions implemented as appropriate)  Goal: Adult Individualization and Mutuality  Outcome: Ongoing (interventions implemented as appropriate)    Problem: Perioperative Period (Adult)  Goal: Signs and Symptoms of Listed Potential Problems Will be Absent or Manageable (Perioperative Period)  Outcome: Ongoing (interventions implemented as appropriate)

## 2017-11-25 NOTE — PLAN OF CARE
Problem: Patient Care Overview (Adult)  Goal: Plan of Care Review  Outcome: Ongoing (interventions implemented as appropriate)    11/25/17 0332   Coping/Psychosocial Response Interventions   Plan Of Care Reviewed With patient   Patient Care Overview   Progress progress toward functional goals as expected   Outcome Evaluation   Outcome Summary/Follow up Plan Pt appears to have slept for most of the 7p shift. NAA drain without problems. Up to BSC x 1 assist. Pt A&O. No problems noted. VSS         Problem: Perioperative Period (Adult)  Goal: Signs and Symptoms of Listed Potential Problems Will be Absent or Manageable (Perioperative Period)  Outcome: Ongoing (interventions implemented as appropriate)    Problem: Laminectomy/Foraminotomy/Discectomy (Adult)  Goal: Signs and Symptoms of Listed Potential Problems Will be Absent or Manageable (Laminectomy/Foraminotomy/Discectomy)  Outcome: Ongoing (interventions implemented as appropriate)

## 2017-11-25 NOTE — PROGRESS NOTES
NEUROSURGERY PROGRESS NOTE     LOS: 0 days   Patient Care Team:  Juan M Nieto MD as PCP - General  Juan M Nieto MD as Referring Physician (Family Medicine)  Eric Neves MD as Consulting Physician (Hematology and Oncology)    Chief Complaint: Bilateral hip and leg pain with walking and standing intolerance.    POD#: 1 Day Post-Op  Procedures:  L4-5 laminectomy.    Interval History:   Patient Complaints: Incisional pain and nausea which has improved.  Patient Denies: Weakness, numbness, or voiding difficulty.    Vital Signs: Blood pressure 116/66, pulse 94, temperature 97.6 °F (36.4 °C), temperature source Oral, resp. rate 20, SpO2 95 %, not currently breastfeeding.  Intake/Output:   Intake/Output Summary (Last 24 hours) at 11/25/17 0805  Last data filed at 11/25/17 0541   Gross per 24 hour   Intake           2223.4 ml   Output             2390 ml   Net           -166.6 ml     Drain output: 70 mL.    Physical Exam:  The patient is awake, alert, and in good spirits.  Her incision is intact and looks quite good.  Motor and sensory function are intact in her lower extremities.     Assessment/Plan:  1.  Lumbar stenosis status post decompression at L4-5.  2.  Disposition: Drain out this morning.  Mobilize.  Discharge home early afternoon today.      Arnold Del Rio MD  11/25/17  8:05 AM

## 2017-12-05 ENCOUNTER — TELEPHONE (OUTPATIENT)
Dept: NEUROSURGERY | Facility: CLINIC | Age: 67
End: 2017-12-05

## 2017-12-05 NOTE — TELEPHONE ENCOUNTER
Provider:  Quang  Caller: Malissa  Time of call:   1113am  Phone #:  [phone]  Surgery:  Lumbar Lami L4-5  Surgery Date:  11/24/17  Last visit:   10/31/17  Next visit: 12/15/17      Reason for call:       Pt wants to know if she is cleared to drive? She states she is only taking her pain medication in the evening.

## 2017-12-11 ENCOUNTER — TELEPHONE (OUTPATIENT)
Dept: NEUROSURGERY | Facility: CLINIC | Age: 67
End: 2017-12-11

## 2017-12-11 NOTE — TELEPHONE ENCOUNTER
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 Vaseline or Vit E for the incision.

## 2017-12-11 NOTE — TELEPHONE ENCOUNTER
Provider: Quang   Caller: jing   Time of call: [id number]     Phone #:  [phone]  Surgery:  LUMBAR LAMINECTOMY L4-5  Surgery Date:11/24/17    Last visit: same      Next visit: 12/15/17         Reason for call: Pt understands that she may drive short distances and would like to know if she is clear to  herself to her appointment on Friday, she lives in [place]. Also, she states that her wound has healed up but it is dry and itchy. She would like to know if there is anything she may put on it to help relieve the itching.

## 2017-12-15 ENCOUNTER — OFFICE VISIT (OUTPATIENT)
Dept: NEUROSURGERY | Facility: CLINIC | Age: 67
End: 2017-12-15

## 2017-12-15 VITALS
TEMPERATURE: 98.5 F | WEIGHT: 162 LBS | BODY MASS INDEX: 26.03 KG/M2 | DIASTOLIC BLOOD PRESSURE: 90 MMHG | SYSTOLIC BLOOD PRESSURE: 138 MMHG | OXYGEN SATURATION: 99 % | HEIGHT: 66 IN | HEART RATE: 99 BPM

## 2017-12-15 DIAGNOSIS — R26.89 BALANCE PROBLEMS: ICD-10-CM

## 2017-12-15 DIAGNOSIS — R26.81 GAIT INSTABILITY: ICD-10-CM

## 2017-12-15 DIAGNOSIS — M48.062 LUMBAR STENOSIS WITH NEUROGENIC CLAUDICATION: Primary | ICD-10-CM

## 2017-12-15 DIAGNOSIS — Z98.890 S/P LUMBAR LAMINECTOMY: ICD-10-CM

## 2017-12-15 PROCEDURE — 99024 POSTOP FOLLOW-UP VISIT: CPT | Performed by: PHYSICIAN ASSISTANT

## 2017-12-15 RX ORDER — DICLOFENAC SODIUM 75 MG/1
TABLET, DELAYED RELEASE ORAL
Status: ON HOLD | COMMUNITY
Start: 2017-11-09 | End: 2018-02-13

## 2017-12-15 NOTE — PROGRESS NOTES
[facility]    Patient: Malissa Child      Date: 12/15/17  : 1950   MRN: [id number]  ______________________________________________________________________      CHIEF COMPLIANT:   Bilateral hip and leg pain with walking and standing intolerance    HISTORY OF PRESENT ILLNESS  Ms. Child is a 67 y.o. retired former [employer] and recently  woman who presents today for postop follow up.   Pre-operatively, she presented with progressive pain in her hips, pain and numbness extending into the top of both feet, and poor balance.  Studies demonstrated generous stenosis at L4-5.  She was noted to have a large right L5-S1 disc herniation which improved with nonoperative measures back in .  On 17, she underwent L4-5 laminectomy with no overt intraoperative complications.    Today, she reports improvement of her symptoms.  Her back and bilateral lower extremity pain have improved.  Her walking and standing intolerance are slowly improving as well.  She is now able to get on her tippy toes.  Although improved, she does continue to struggle with her gait and balance.   Her incision is healing nicely without wound drainage, redness, swelling, or pain at incision site.  No fevers, chills, malaise, new pain, new weakness, headaches, B/B changes or saddle anesthesia.      The patient's past medical history, past surgical history, family history, and social history have been reviewed at length in the electronic medical record.      Past Medical History:   Diagnosis Date   • Arthritis    • Back pain    • Breast cancer    • Depression    • Diabetes mellitus     dx - does not check fsbs   • Disease of thyroid gland    • History of breast cancer    • Hyperlipidemia    • Hypertension    • Sleep apnea with use of continuous positive airway pressure (CPAP)    • Wears eyeglasses    • Wears partial dentures      Past Surgical History:   Procedure Laterality  Date   • APPENDECTOMY     • BREAST LUMPECTOMY Left 1995   • COLONOSCOPY      > 5 years ago   • KNEE CARTILAGE SURGERY Left    • LUMBAR LAMINECTOMY DISCECTOMY DECOMPRESSION N/A 11/24/2017    Procedure: LUMBAR LAMINECTOMY L4-5;  Surgeon: Arnold Del Rio MD;  Location: [facility];  Service:    • MASTECTOMY Left      Social History     Social History   • Marital status:      Spouse name: N/A   • Number of children: N/A   • Years of education: N/A     Occupational History   • Not on file.     Social History Main Topics   • Smoking status: Never Smoker   • Smokeless tobacco: Never Used   • Alcohol use Yes      Comment: occasion   • Drug use: No   • Sexual activity: Defer     Other Topics Concern   • Not on file     Social History Narrative     Ms. Child has a current medication list which includes the following prescription(s): amitriptyline, diclofenac, exemestane, fluoxetine, glimepiride, hydrocodone-acetaminophen, ibuprofen, jardiance, levothyroxine, lisinopril, metformin, naproxen sodium, and pravastatin. See EMR for details.  She is allergic to cat hair extract; dust mite extract; and mold extract [trichophyton].      Review of Systems   Constitutional: Negative for activity change, appetite change, chills, diaphoresis, fatigue, fever and unexpected weight change.   HENT: Negative for congestion, dental problem, drooling, ear discharge, ear pain, facial swelling, hearing loss, mouth sores, nosebleeds, postnasal drip, rhinorrhea, sinus pressure, sneezing, sore throat, tinnitus, trouble swallowing and voice change.    Eyes: Negative for photophobia, pain, discharge, redness, itching and visual disturbance.   Respiratory: Negative for apnea, cough, choking, chest tightness, shortness of breath, wheezing and stridor.    Cardiovascular: Negative for chest pain, palpitations and leg swelling.   Gastrointestinal: Negative for abdominal distention, abdominal pain, anal bleeding, blood in stool, constipation,  "diarrhea, nausea, rectal pain and vomiting.   Endocrine: Negative for cold intolerance, heat intolerance, polydipsia, polyphagia and polyuria.   Genitourinary: Negative for decreased urine volume, difficulty urinating, dysuria, enuresis, flank pain, frequency, genital sores, hematuria and urgency.   Musculoskeletal: Positive for back pain and gait problem. Negative for arthralgias, joint swelling, myalgias, neck pain and neck stiffness.   Skin: Negative for color change, pallor, rash and wound.   Allergic/Immunologic: Negative for environmental allergies, food allergies and immunocompromised state.   Neurological: Negative for dizziness, tremors, seizures, syncope, facial asymmetry, speech difficulty, weakness, light-headedness, numbness and headaches.   Hematological: Negative for adenopathy. Does not bruise/bleed easily.   Psychiatric/Behavioral: Negative for agitation, behavioral problems, confusion, decreased concentration, dysphoric mood, hallucinations, self-injury, sleep disturbance and suicidal ideas. The patient is not nervous/anxious and is not hyperactive.    All other systems reviewed and are negative.       OBJECTIVE  Physical Exam  Vitals: Blood pressure 138/90, pulse 99, temperature 98.5 °F (36.9 °C), temperature source Temporal Artery , height 167.6 cm (65.98\"), weight 73.5 kg (162 lb), SpO2 99 %, not currently breastfeeding.  Body mass index is 26.16 kg/(m^2).   General Appearance:   WDWN, alert, and cooperative. NAD.   HEENT: NCAT   Chest Breathing unlabored.   Mood: Appropriate mood and affect.   Higher Integrative Fx: AAOx3. Speech intact.     Skin: Skin incision is well approximated at appropriate stage of healing without warmth, drainage, or erythema.        Medical Decision Making    ASSESSMENT  1. Lumbar stenosis with neurogenic claudication S/P lumbar laminectomy   2. Gait and balance dysfunction    DISCUSSION  Ms. Child is stable and doing well status post L4-5 laminectomy.  Her incision " is healing well with no s/s of infection.   I have ordered therapy to work with her to strengthen her gait and improved balance.    Reviewed activity recommendations, home exercises, and wound care with patient today. She should continue to advance activity as tolerated, limit repetitive bending, lifting, and twisting of the lower back, and limit vigorous activities.  Continue supportive measures as needed.      Return for f/up with Dr. Del Rio, in 6 weeks. She should call or RTC sooner if symptoms worsen or new symptoms develop.     Orders Placed This Encounter   Procedures   • Ambulatory Referral to Physical Therapy Evaluate and treat       Eileen Carson PA-C  [facility]  12/15/17    ______________________________________________  Patient Care Team:  Juan M Nieto MD as PCP - General  Juan M Nieto MD as Referring Physician (Family Medicine)  Eric Neves MD as Consulting Physician (Hematology and Oncology)

## 2018-01-02 ENCOUNTER — TELEPHONE (OUTPATIENT)
Dept: NEUROSURGERY | Facility: CLINIC | Age: 68
End: 2018-01-02

## 2018-01-02 NOTE — TELEPHONE ENCOUNTER
Provider: Quang   Caller: jing   Time of call: 435    Phone #:  [phone]  Surgery:  LUMBAR LAMINECTOMY L4-5  Surgery Date:11/24/17    Last visit: 12/15/17      Next visit: 1/26/18     Reason for call:         Pt left message stating that she fell on a tile floor on her right side last Wednesday. She went to the local hospital where they performed a CT scan and apparently she is fine. She reports improvement, she only experiences pain on her right side (not in back) and only during the night. FYI unless we have inquiries or concerns.

## 2018-01-03 NOTE — TELEPHONE ENCOUNTER
Patient called again this morning to tell us about her falling. She states that she is in pain and wants to know what to do.   I called her back to get more information but had to leave a message.

## 2018-01-04 NOTE — TELEPHONE ENCOUNTER
2nd attempt to contact pt: went straight to , left detailed message requesting that she call us back. No alternate numbers

## 2018-01-04 NOTE — TELEPHONE ENCOUNTER
Pt states that she has had no improvement since her fall last week. She has seen her PCP this morning because the pain is significant and he prescribed her Flexeril 10mg TID which she is only taking half (5mg) and Norco 7.5 which she has not begun taking. She denied any fever or chills or excess drainage from incision. Her pain begins inferior to her her incision and runs down her biceps femoris and usually terminates at the popliteal region but can sometimes radiate to her ankle. She reports numbness in both toes however she was able to perform bilateral plantar flexion during her examination today with her PCP. Weakness is also noted in her right leg.

## 2018-01-11 NOTE — TELEPHONE ENCOUNTER
SW patient and she said that she fell a couple of times on Sunday and went to the ER when she got home she fell again.  She has been in contact with her PCP who has scheduled her an MRI from, patient thinks, 1/28.  She didn't want to schedule an appointment since she feels like she needs scans prior to coming in.  Please advise on how to proceed.

## 2018-01-26 ENCOUNTER — OFFICE VISIT (OUTPATIENT)
Dept: NEUROSURGERY | Facility: CLINIC | Age: 68
End: 2018-01-26

## 2018-01-26 VITALS — TEMPERATURE: 97.5 F | WEIGHT: 159 LBS | BODY MASS INDEX: 25.55 KG/M2 | HEIGHT: 66 IN

## 2018-01-26 DIAGNOSIS — M51.36 BULGING LUMBAR DISC: ICD-10-CM

## 2018-01-26 DIAGNOSIS — R26.81 GAIT INSTABILITY: ICD-10-CM

## 2018-01-26 DIAGNOSIS — R26.89 BALANCE PROBLEMS: ICD-10-CM

## 2018-01-26 DIAGNOSIS — Z98.890 S/P LUMBAR LAMINECTOMY: Primary | ICD-10-CM

## 2018-01-26 DIAGNOSIS — M47.816 FACET ARTHRITIS OF LUMBAR REGION: ICD-10-CM

## 2018-01-26 DIAGNOSIS — M48.062 SPINAL STENOSIS OF LUMBAR REGION WITH NEUROGENIC CLAUDICATION: ICD-10-CM

## 2018-01-26 DIAGNOSIS — M51.36 DEGENERATIVE DISC DISEASE, LUMBAR: ICD-10-CM

## 2018-01-26 PROCEDURE — 99024 POSTOP FOLLOW-UP VISIT: CPT | Performed by: NEUROLOGICAL SURGERY

## 2018-01-26 RX ORDER — HYDROCODONE BITARTRATE AND ACETAMINOPHEN 5; 325 MG/1; MG/1
TABLET ORAL
COMMUNITY
Start: 2017-12-30 | End: 2019-04-03

## 2018-01-26 NOTE — PROGRESS NOTES
Patient: Malissa Child  : 1950    Primary Care Provider: Juan M Nieto MD    Requesting Provider: As above        History    Chief Complaint: Right leg weakness and numbness.    History of Present Illness: Ms. Child is a 67-year-old former [employer] who is known in the past to have a right L5-S1 disc herniation that was managed nonoperatively in .  More recently she presented with progressive pain in her hips with numbness extending into both feet.  Her balance was noted to be poor.  On 17 she underwent L4-5 laminectomy.  When seen last in December she was doing better.  She now describes a 2-3 week history of some numbness and weakness in her right leg.  She reports falling.  On occasion she has some back and leg pain.    Review of Systems   Constitutional: Negative for activity change, appetite change, chills, diaphoresis, fatigue, fever and unexpected weight change.   HENT: Negative for congestion, dental problem, drooling, ear discharge, ear pain, facial swelling, hearing loss, mouth sores, nosebleeds, postnasal drip, rhinorrhea, sinus pressure, sneezing, sore throat, tinnitus, trouble swallowing and voice change.    Eyes: Negative for photophobia, pain, discharge, redness, itching and visual disturbance.   Respiratory: Negative for apnea, cough, choking, chest tightness, shortness of breath, wheezing and stridor.    Cardiovascular: Negative for chest pain, palpitations and leg swelling.   Gastrointestinal: Negative for abdominal distention, abdominal pain, anal bleeding, blood in stool, constipation, diarrhea, nausea, rectal pain and vomiting.   Endocrine: Negative for cold intolerance, heat intolerance, polydipsia, polyphagia and polyuria.   Genitourinary: Negative for decreased urine volume, difficulty urinating, dysuria, enuresis, flank pain, frequency, genital sores, hematuria and urgency.   Musculoskeletal: Positive for back pain and gait problem. Negative for arthralgias, joint  "swelling, myalgias, neck pain and neck stiffness.   Skin: Negative for color change, pallor, rash and wound.   Allergic/Immunologic: Negative for environmental allergies, food allergies and immunocompromised state.   Neurological: Negative for dizziness, tremors, seizures, syncope, facial asymmetry, speech difficulty, weakness, light-headedness, numbness and headaches.   Hematological: Negative for adenopathy. Does not bruise/bleed easily.   Psychiatric/Behavioral: Negative for agitation, behavioral problems, confusion, decreased concentration, dysphoric mood, hallucinations, self-injury, sleep disturbance and suicidal ideas. The patient is not nervous/anxious and is not hyperactive.    All other systems reviewed and are negative.      The patient's past medical history, past surgical history, family history, and social history have been reviewed at length in the electronic medical record.    Physical Exam:   Temp 97.5 °F (36.4 °C) (Temporal Artery )   Ht 167.6 cm (65.98\")  Wt 72.1 kg (159 lb)  BMI 25.68 kg/m2  The patient relates independently but there may be some subtle weakness in dorsiflexion of her right foot even when walking.  Sensation is diminished light touch testing the right lateral calf.  Dorsiflexion of the foot on the right is 4 minus/5.    Medical Decision Making    Data Review:   Follow-up MRI demonstrates good decompression at L4-5.  There is some broad-based spurring or disc bulging rightward toward the recess and proximal foramen at L4-5.  There is a small disc protrusion in the proximal foramen on the right at L5-S1.  These disc findings were present on her preoperative study.  The quality of her most recent lumbar study is quite limited.    Diagnosis:   Lumbar stenosis status post decompression.    Treatment Options:   Certainly some of the patient's current complaints could emanate from her back.  We could also be dealing with more of a peripheral nerve mediated process.  She's going to " attend physical therapy for the next few weeks and then follow up in our clinic.  If she continues to have significant issues with her right leg then we will check electrodiagnostic studies and arrange follow-up.       Diagnosis Plan   1. S/P lumbar laminectomy     2. Gait instability     3. Balance problems     4. Spinal stenosis of lumbar region with neurogenic claudication     5. Bulging lumbar disc     6. Degenerative disc disease, lumbar     7. Facet arthritis of lumbar region       Scribed for Arnold Del Rio MD by Christianne Manning CMA on 01/26/2018 at 12:56 PM    I, Dr. Del Rio, personally performed the services described in the documentation, as scribed in my presence, and it is both accurate and complete.

## 2018-02-13 ENCOUNTER — APPOINTMENT (OUTPATIENT)
Dept: MRI IMAGING | Facility: HOSPITAL | Age: 68
End: 2018-02-13

## 2018-02-13 ENCOUNTER — APPOINTMENT (OUTPATIENT)
Dept: GENERAL RADIOLOGY | Facility: HOSPITAL | Age: 68
End: 2018-02-13

## 2018-02-13 ENCOUNTER — APPOINTMENT (OUTPATIENT)
Dept: CT IMAGING | Facility: HOSPITAL | Age: 68
End: 2018-02-13

## 2018-02-13 ENCOUNTER — HOSPITAL ENCOUNTER (INPATIENT)
Facility: HOSPITAL | Age: 68
LOS: 2 days | Discharge: NURSING, REHAB OR LONG-TERM CARE | End: 2018-02-15
Attending: EMERGENCY MEDICINE | Admitting: INTERNAL MEDICINE

## 2018-02-13 DIAGNOSIS — Z78.9 IMPAIRED MOBILITY AND ADLS: ICD-10-CM

## 2018-02-13 DIAGNOSIS — M48.062 LUMBAR STENOSIS WITH NEUROGENIC CLAUDICATION: ICD-10-CM

## 2018-02-13 DIAGNOSIS — G45.9 TRANSIENT CEREBRAL ISCHEMIA, UNSPECIFIED TYPE: Primary | ICD-10-CM

## 2018-02-13 DIAGNOSIS — E11.42 DIABETIC PERIPHERAL NEUROPATHY ASSOCIATED WITH TYPE 2 DIABETES MELLITUS (HCC): ICD-10-CM

## 2018-02-13 DIAGNOSIS — Z74.09 IMPAIRED MOBILITY AND ADLS: ICD-10-CM

## 2018-02-13 PROBLEM — E78.5 HYPERLIPIDEMIA LDL GOAL <70: Status: ACTIVE | Noted: 2018-02-13

## 2018-02-13 PROBLEM — Z99.89 OSA ON CPAP: Status: ACTIVE | Noted: 2018-02-13

## 2018-02-13 PROBLEM — D75.1 POLYCYTHEMIA: Status: ACTIVE | Noted: 2018-02-13

## 2018-02-13 PROBLEM — Z85.3 PERSONAL HISTORY OF BREAST CANCER: Status: ACTIVE | Noted: 2018-02-13

## 2018-02-13 PROBLEM — Z86.79 HISTORY OF ATRIAL FIBRILLATION WITHOUT CURRENT MEDICATION: Status: ACTIVE | Noted: 2018-02-13

## 2018-02-13 PROBLEM — G47.33 OSA ON CPAP: Status: ACTIVE | Noted: 2018-02-13

## 2018-02-13 PROBLEM — G81.91 RIGHT HEMIPARESIS (HCC): Status: ACTIVE | Noted: 2018-02-13

## 2018-02-13 PROBLEM — IMO0002 DM (DIABETES MELLITUS), TYPE 2, UNCONTROLLED: Status: ACTIVE | Noted: 2018-02-13

## 2018-02-13 PROBLEM — R47.01 EXPRESSIVE APHASIA: Status: ACTIVE | Noted: 2018-02-13

## 2018-02-13 PROBLEM — I10 ESSENTIAL HYPERTENSION: Status: ACTIVE | Noted: 2018-02-13

## 2018-02-13 LAB
BASOPHILS # BLD AUTO: 0.07 10*3/MM3 (ref 0–0.2)
BASOPHILS NFR BLD AUTO: 0.6 % (ref 0–1)
DEPRECATED RDW RBC AUTO: 40.2 FL (ref 37–54)
EOSINOPHIL # BLD AUTO: 0.35 10*3/MM3 (ref 0–0.3)
EOSINOPHIL NFR BLD AUTO: 2.9 % (ref 0–3)
ERYTHROCYTE [DISTWIDTH] IN BLOOD BY AUTOMATED COUNT: 13.2 % (ref 11.3–14.5)
HCT VFR BLD AUTO: 44.4 % (ref 34.5–44)
HGB BLD-MCNC: 15.9 G/DL (ref 11.5–15.5)
HOLD SPECIMEN: NORMAL
HOLD SPECIMEN: NORMAL
IMM GRANULOCYTES # BLD: 0.09 10*3/MM3 (ref 0–0.03)
IMM GRANULOCYTES NFR BLD: 0.8 % (ref 0–0.6)
INR PPP: 1 (ref 0.8–1.2)
LYMPHOCYTES # BLD AUTO: 4.32 10*3/MM3 (ref 0.6–4.8)
LYMPHOCYTES NFR BLD AUTO: 36.1 % (ref 24–44)
MCH RBC QN AUTO: 30.2 PG (ref 27–31)
MCHC RBC AUTO-ENTMCNC: 35.8 G/DL (ref 32–36)
MCV RBC AUTO: 84.4 FL (ref 80–99)
MONOCYTES # BLD AUTO: 0.92 10*3/MM3 (ref 0–1)
MONOCYTES NFR BLD AUTO: 7.7 % (ref 0–12)
NEUTROPHILS # BLD AUTO: 6.21 10*3/MM3 (ref 1.5–8.3)
NEUTROPHILS NFR BLD AUTO: 51.9 % (ref 41–71)
PLATELET # BLD AUTO: 289 10*3/MM3 (ref 150–450)
PMV BLD AUTO: 10.5 FL (ref 6–12)
PROTHROMBIN TIME: 11.6 SECONDS (ref 12.8–15.2)
RBC # BLD AUTO: 5.26 10*6/MM3 (ref 3.89–5.14)
TROPONIN I SERPL-MCNC: 0 NG/ML (ref 0–0.07)
WBC NRBC COR # BLD: 11.96 10*3/MM3 (ref 3.5–10.8)
WHOLE BLOOD HOLD SPECIMEN: NORMAL
WHOLE BLOOD HOLD SPECIMEN: NORMAL

## 2018-02-13 PROCEDURE — 84484 ASSAY OF TROPONIN QUANT: CPT

## 2018-02-13 PROCEDURE — 71045 X-RAY EXAM CHEST 1 VIEW: CPT

## 2018-02-13 PROCEDURE — 99285 EMERGENCY DEPT VISIT HI MDM: CPT

## 2018-02-13 PROCEDURE — 85730 THROMBOPLASTIN TIME PARTIAL: CPT | Performed by: FAMILY MEDICINE

## 2018-02-13 PROCEDURE — 84443 ASSAY THYROID STIM HORMONE: CPT | Performed by: FAMILY MEDICINE

## 2018-02-13 PROCEDURE — 25010000002 LORAZEPAM PER 2 MG: Performed by: EMERGENCY MEDICINE

## 2018-02-13 PROCEDURE — 70551 MRI BRAIN STEM W/O DYE: CPT

## 2018-02-13 PROCEDURE — 83036 HEMOGLOBIN GLYCOSYLATED A1C: CPT | Performed by: FAMILY MEDICINE

## 2018-02-13 PROCEDURE — 70496 CT ANGIOGRAPHY HEAD: CPT

## 2018-02-13 PROCEDURE — 80053 COMPREHEN METABOLIC PANEL: CPT | Performed by: FAMILY MEDICINE

## 2018-02-13 PROCEDURE — 0042T HC CT CEREBRAL PERFUSION W/WO CONTRAST: CPT

## 2018-02-13 PROCEDURE — 70498 CT ANGIOGRAPHY NECK: CPT

## 2018-02-13 PROCEDURE — 83735 ASSAY OF MAGNESIUM: CPT | Performed by: FAMILY MEDICINE

## 2018-02-13 PROCEDURE — 80047 BASIC METABLC PNL IONIZED CA: CPT

## 2018-02-13 PROCEDURE — 99223 1ST HOSP IP/OBS HIGH 75: CPT | Performed by: FAMILY MEDICINE

## 2018-02-13 PROCEDURE — 85025 COMPLETE CBC W/AUTO DIFF WBC: CPT | Performed by: EMERGENCY MEDICINE

## 2018-02-13 PROCEDURE — 85610 PROTHROMBIN TIME: CPT | Performed by: FAMILY MEDICINE

## 2018-02-13 PROCEDURE — 85014 HEMATOCRIT: CPT

## 2018-02-13 PROCEDURE — 84484 ASSAY OF TROPONIN QUANT: CPT | Performed by: FAMILY MEDICINE

## 2018-02-13 PROCEDURE — 0 IOPAMIDOL PER 1 ML: Performed by: EMERGENCY MEDICINE

## 2018-02-13 PROCEDURE — 85610 PROTHROMBIN TIME: CPT

## 2018-02-13 PROCEDURE — 93005 ELECTROCARDIOGRAM TRACING: CPT | Performed by: EMERGENCY MEDICINE

## 2018-02-13 PROCEDURE — 70450 CT HEAD/BRAIN W/O DYE: CPT

## 2018-02-13 RX ORDER — LABETALOL HYDROCHLORIDE 5 MG/ML
10 INJECTION, SOLUTION INTRAVENOUS EVERY 4 HOURS PRN
Status: DISCONTINUED | OUTPATIENT
Start: 2018-02-13 | End: 2018-02-15 | Stop reason: HOSPADM

## 2018-02-13 RX ORDER — NICOTINE POLACRILEX 4 MG
15 LOZENGE BUCCAL
Status: DISCONTINUED | OUTPATIENT
Start: 2018-02-13 | End: 2018-02-15 | Stop reason: HOSPADM

## 2018-02-13 RX ORDER — SODIUM CHLORIDE 0.9 % (FLUSH) 0.9 %
1-10 SYRINGE (ML) INJECTION AS NEEDED
Status: DISCONTINUED | OUTPATIENT
Start: 2018-02-13 | End: 2018-02-15 | Stop reason: HOSPADM

## 2018-02-13 RX ORDER — ASPIRIN 300 MG/1
300 SUPPOSITORY RECTAL DAILY
Status: DISCONTINUED | OUTPATIENT
Start: 2018-02-14 | End: 2018-02-15 | Stop reason: HOSPADM

## 2018-02-13 RX ORDER — SODIUM CHLORIDE 0.9 % (FLUSH) 0.9 %
10 SYRINGE (ML) INJECTION AS NEEDED
Status: DISCONTINUED | OUTPATIENT
Start: 2018-02-13 | End: 2018-02-15 | Stop reason: HOSPADM

## 2018-02-13 RX ORDER — HYDROCODONE BITARTRATE AND ACETAMINOPHEN 5; 325 MG/1; MG/1
1 TABLET ORAL EVERY 6 HOURS PRN
Status: DISCONTINUED | OUTPATIENT
Start: 2018-02-13 | End: 2018-02-15 | Stop reason: HOSPADM

## 2018-02-13 RX ORDER — ATORVASTATIN CALCIUM 40 MG/1
80 TABLET, FILM COATED ORAL NIGHTLY
Status: DISCONTINUED | OUTPATIENT
Start: 2018-02-14 | End: 2018-02-15 | Stop reason: HOSPADM

## 2018-02-13 RX ORDER — LORAZEPAM 2 MG/ML
1 INJECTION INTRAMUSCULAR ONCE
Status: COMPLETED | OUTPATIENT
Start: 2018-02-13 | End: 2018-02-13

## 2018-02-13 RX ORDER — LEVOTHYROXINE SODIUM 88 UG/1
88 TABLET ORAL DAILY
Status: DISCONTINUED | OUTPATIENT
Start: 2018-02-14 | End: 2018-02-15 | Stop reason: HOSPADM

## 2018-02-13 RX ORDER — ASPIRIN 325 MG
325 TABLET ORAL DAILY
Status: DISCONTINUED | OUTPATIENT
Start: 2018-02-14 | End: 2018-02-15 | Stop reason: HOSPADM

## 2018-02-13 RX ORDER — FLUOXETINE HYDROCHLORIDE 20 MG/1
20 CAPSULE ORAL DAILY
Status: DISCONTINUED | OUTPATIENT
Start: 2018-02-14 | End: 2018-02-15 | Stop reason: HOSPADM

## 2018-02-13 RX ORDER — DOCUSATE SODIUM 100 MG/1
100 CAPSULE, LIQUID FILLED ORAL 2 TIMES DAILY
Status: DISCONTINUED | OUTPATIENT
Start: 2018-02-14 | End: 2018-02-15 | Stop reason: HOSPADM

## 2018-02-13 RX ORDER — DEXTROSE MONOHYDRATE 25 G/50ML
25 INJECTION, SOLUTION INTRAVENOUS
Status: DISCONTINUED | OUTPATIENT
Start: 2018-02-13 | End: 2018-02-15 | Stop reason: HOSPADM

## 2018-02-13 RX ORDER — FAMOTIDINE 20 MG/1
40 TABLET, FILM COATED ORAL NIGHTLY
Status: DISCONTINUED | OUTPATIENT
Start: 2018-02-14 | End: 2018-02-15 | Stop reason: HOSPADM

## 2018-02-13 RX ADMIN — IOPAMIDOL 115 ML: 755 INJECTION, SOLUTION INTRAVENOUS at 17:07

## 2018-02-13 RX ADMIN — LORAZEPAM 1 MG: 2 INJECTION INTRAMUSCULAR; INTRAVENOUS at 19:24

## 2018-02-13 NOTE — ED PROVIDER NOTES
Subjective   HPI Comments: Malissa Child is a 67 y.o.female with a history of atrial fibrillation, diabetes mellitus, high cholesterol and hypertension who presents to the ED with c/o abnormal neurologic symptoms. At 1400 the patient suddenly developed word finding difficulties, and her family called EMS to bring her to the ED for evaluation. The patient reported to EMS that she felt weaker on the right side. En route her blood pressure was 180/115 and her FSBS was 360. She reports that she took her last metformin dose this morning. At the ED she states that she fell just under a week ago and hit her right lower chest, hurting her ribcage. She denies any other acute symptoms at this time. The patient and EMS both report that her speech sounds better than during initial presentation.    No history of CVA.      Patient is a 67 y.o. female presenting with neurologic complaint.   History provided by:  Patient and EMS personnel  Neurologic Problem   The patient's primary symptoms include focal weakness (right sided) and weakness (right sided). Primary symptoms comment: word finding difficulty. This is a new problem. The current episode started today. The neurological problem developed suddenly. The last time the patient was known to be well was 2/13/2018 4:00 PM.  The problem has been gradually improving since onset. There was right-sided focality noted. Associated symptoms include chest pain (Right rib cage s/p fall). Pertinent negatives include no headaches. Past treatments include nothing. There is no history of a CVA.       Review of Systems   Cardiovascular: Positive for chest pain (Right rib cage s/p fall).   Endocrine:        Blood glucose generally quite elevated.   Neurological: Positive for focal weakness (right sided), speech difficulty (word finding difficulty) and weakness (right sided). Negative for headaches.   All other systems reviewed and are negative.      Past Medical History:   Diagnosis Date   •  Arthritis    • Back pain    • Breast cancer     left breast, reoccurance times 3   • Depression    • Diabetes mellitus     dx 2000- does not check fsbs   • Disease of thyroid gland    • History of breast cancer    • Hyperlipidemia    • Hypertension    • Lumbar stenosis with neurogenic claudication 11/1/2017    Added automatically from request for surgery [id number]   • Sleep apnea with use of continuous positive airway pressure (CPAP)    • Wears eyeglasses    • Wears partial dentures        Allergies   Allergen Reactions   • Cat Hair Extract    • Dust Mite Extract    • Mold Extract [Trichophyton]        Past Surgical History:   Procedure Laterality Date   • APPENDECTOMY  2012   • BREAST LUMPECTOMY Left 1995   • BREAST LUMPECTOMY Left 2012   • BREAST SURGERY Left 2014    mastectomy and reconstruction   • COLONOSCOPY      > 5 years ago   • COLONOSCOPY  2010?   • KNEE CARTILAGE SURGERY Left    • LUMBAR LAMINECTOMY DISCECTOMY DECOMPRESSION N/A 11/24/2017    Procedure: LUMBAR LAMINECTOMY L4-5;  Surgeon: Arnold Del Rio MD;  Location: [facility];  Service:    • MASTECTOMY Left        Family History   Problem Relation Age of Onset   • Breast cancer Mother    • Cancer Father    • Cancer Brother    • Pancreatitis Brother    • Cancer Maternal Grandmother    • Cancer Maternal Grandfather    • Dementia Paternal Grandmother    • Cancer Paternal Grandfather    • Lymphoma Brother        Social History     Social History   • Marital status:      Spouse name: N/A   • Number of children: N/A   • Years of education: N/A     Social History Main Topics   • Smoking status: Never Smoker   • Smokeless tobacco: Never Used   • Alcohol use Yes      Comment: occasion, less than once a month   • Drug use: No   • Sexual activity: Not Currently     Partners: Male      Comment:       Other Topics Concern   • None     Social History Narrative   • None         Objective   Physical Exam   Constitutional: She is oriented to person, place,  and time. She appears well-developed and well-nourished. No distress.   HENT:   Head: Normocephalic and atraumatic.   Mouth/Throat: No oropharyngeal exudate.   Airway patent.   Eyes: Conjunctivae are normal. No scleral icterus.   Neck: Normal range of motion. Neck supple. No JVD present.   Cardiovascular: Normal rate, regular rhythm and normal heart sounds.  Exam reveals no gallop and no friction rub.    No murmur heard.  Pulmonary/Chest: Effort normal and breath sounds normal. No respiratory distress. She has no wheezes. She has no rales.   Abdominal: Soft. Bowel sounds are normal. She exhibits no distension. There is no tenderness. There is no rebound and no guarding.   Musculoskeletal: Normal range of motion. She exhibits no edema.   No pretibial edema.   Neurological: She is alert and oriented to person, place, and time.   Speech is very deliberate. NIHSS = 0   Skin: Skin is warm and dry. She is not diaphoretic.   Psychiatric: She has a normal mood and affect. Her behavior is normal.   Nursing note and vitals reviewed.      Procedures         ED Course  ED Course     Recent Results (from the past 24 hour(s))   Light Blue Top    Collection Time: 02/13/18  5:02 PM   Result Value Ref Range    Extra Tube hold for add-on    Green Top (Gel)    Collection Time: 02/13/18  5:02 PM   Result Value Ref Range    Extra Tube Hold for add-ons.    Lavender Top    Collection Time: 02/13/18  5:02 PM   Result Value Ref Range    Extra Tube hold for add-on    Gold Top - SST    Collection Time: 02/13/18  5:02 PM   Result Value Ref Range    Extra Tube Hold for add-ons.    CBC Auto Differential    Collection Time: 02/13/18  5:02 PM   Result Value Ref Range    WBC 11.96 (H) 3.50 - 10.80 10*3/mm3    RBC 5.26 (H) 3.89 - 5.14 10*6/mm3    Hemoglobin 15.9 (H) 11.5 - 15.5 g/dL    Hematocrit 44.4 (H) 34.5 - 44.0 %    MCV 84.4 80.0 - 99.0 fL    MCH 30.2 27.0 - 31.0 pg    MCHC 35.8 32.0 - 36.0 g/dL    RDW 13.2 11.3 - 14.5 %    RDW-SD 40.2 37.0 -  54.0 fl    MPV 10.5 6.0 - 12.0 fL    Platelets 289 150 - 450 10*3/mm3    Neutrophil % 51.9 41.0 - 71.0 %    Lymphocyte % 36.1 24.0 - 44.0 %    Monocyte % 7.7 0.0 - 12.0 %    Eosinophil % 2.9 0.0 - 3.0 %    Basophil % 0.6 0.0 - 1.0 %    Immature Grans % 0.8 (H) 0.0 - 0.6 %    Neutrophils, Absolute 6.21 1.50 - 8.30 10*3/mm3    Lymphocytes, Absolute 4.32 0.60 - 4.80 10*3/mm3    Monocytes, Absolute 0.92 0.00 - 1.00 10*3/mm3    Eosinophils, Absolute 0.35 (H) 0.00 - 0.30 10*3/mm3    Basophils, Absolute 0.07 0.00 - 0.20 10*3/mm3    Immature Grans, Absolute 0.09 (H) 0.00 - 0.03 10*3/mm3   POC Protime / INR    Collection Time: 02/13/18  5:02 PM   Result Value Ref Range    Protime 11.6 (L) 12.8 - 15.2 seconds    INR 1.0 0.8 - 1.2   POC Troponin, Rapid    Collection Time: 02/13/18  5:07 PM   Result Value Ref Range    Troponin I 0.00 0.00 - 0.07 ng/mL     Note: In addition to lab results from this visit, the labs listed above may include labs taken at another facility or during a different encounter within the last 24 hours. Please correlate lab times with ED admission and discharge times for further clarification of the services performed during this visit.    MRI Brain Without Contrast   Final Result     Periventricular white matter changes consistent with chronic microvascular    ischemia.     Generalized cerebral atrophy.     Otherwise, no acute changes within the brain.      THIS DOCUMENT HAS BEEN ELECTRONICALLY SIGNED BY HEIDE FRENCH MD      CT Head Without Contrast Stroke Protocol    (Results Pending)   CT Cerebral Perfusion With & Without Contrast    (Results Pending)   CT Angiogram Neck With & Without Contrast    (Results Pending)   CT Angiogram Head With & Without Contrast    (Results Pending)   XR Chest 1 View    (Results Pending)     Vitals:    02/13/18 2031 02/13/18 2045 02/13/18 2100 02/13/18 2137   BP:  (!) 168/103 (!) 149/125 (!) 167/114   BP Location:    Right arm   Patient Position:    Lying   Pulse: 112 112  111 110   Resp:    16   Temp:       TempSrc:       SpO2: 99% 96% 96% 99%   Weight:       Height:         Medications   sodium chloride 0.9 % flush 10 mL (not administered)   iopamidol (ISOVUE-370) 76 % injection 150 mL (115 mL Intravenous Given 2/13/18 1707)   LORazepam (ATIVAN) injection 1 mg (1 mg Intravenous Given 2/13/18 1924)     ECG/EMG Results (last 24 hours)     ** No results found for the last 24 hours. **                    [facility]    Final diagnoses:   Transient cerebral ischemia, unspecified type       Documentation assistance provided by eduardo Moran.  Information recorded by the scribe was done at my direction and has been verified and validated by me.     Alphonso Moran  02/13/18 1716       Keenan Everett MD  02/13/18 6439

## 2018-02-14 PROBLEM — F33.9 RECURRENT MAJOR DEPRESSIVE DISORDER (HCC): Status: ACTIVE | Noted: 2018-02-14

## 2018-02-14 LAB
ALBUMIN SERPL-MCNC: 4 G/DL (ref 3.2–4.8)
ALBUMIN/GLOB SERPL: 1.5 G/DL (ref 1.5–2.5)
ALP SERPL-CCNC: 97 U/L (ref 25–100)
ALT SERPL W P-5'-P-CCNC: 19 U/L (ref 7–40)
ANION GAP SERPL CALCULATED.3IONS-SCNC: 13 MMOL/L (ref 3–11)
APTT PPP: 26.4 SECONDS (ref 24–31)
ARTICHOKE IGE QN: 95 MG/DL (ref 0–130)
AST SERPL-CCNC: 20 U/L (ref 0–33)
BILIRUB SERPL-MCNC: 0.8 MG/DL (ref 0.3–1.2)
BILIRUB UR QL STRIP: NEGATIVE
BUN BLD-MCNC: 13 MG/DL (ref 9–23)
BUN BLDA-MCNC: 16 MG/DL (ref 8–26)
BUN/CREAT SERPL: 21.7 (ref 7–25)
CA-I BLDA-SCNC: 1.17 MMOL/L (ref 1.2–1.32)
CALCIUM SPEC-SCNC: 8.9 MG/DL (ref 8.7–10.4)
CHLORIDE BLDA-SCNC: 97 MMOL/L (ref 98–109)
CHLORIDE SERPL-SCNC: 102 MMOL/L (ref 99–109)
CHOLEST SERPL-MCNC: 148 MG/DL (ref 0–200)
CLARITY UR: CLEAR
CO2 BLDA-SCNC: 24 MMOL/L (ref 24–29)
CO2 SERPL-SCNC: 22 MMOL/L (ref 20–31)
COLOR UR: YELLOW
CREAT BLD-MCNC: 0.6 MG/DL (ref 0.6–1.3)
CREAT BLDA-MCNC: 0.5 MG/DL (ref 0.6–1.3)
GFR SERPL CREATININE-BSD FRML MDRD: 100 ML/MIN/1.73
GLOBULIN UR ELPH-MCNC: 2.7 GM/DL
GLUCOSE BLD-MCNC: 231 MG/DL (ref 70–100)
GLUCOSE BLDC GLUCOMTR-MCNC: 234 MG/DL (ref 70–130)
GLUCOSE BLDC GLUCOMTR-MCNC: 236 MG/DL (ref 70–130)
GLUCOSE BLDC GLUCOMTR-MCNC: 278 MG/DL (ref 70–130)
GLUCOSE BLDC GLUCOMTR-MCNC: 280 MG/DL (ref 70–130)
GLUCOSE BLDC GLUCOMTR-MCNC: 291 MG/DL (ref 70–130)
GLUCOSE BLDC GLUCOMTR-MCNC: 364 MG/DL (ref 70–130)
GLUCOSE UR STRIP-MCNC: ABNORMAL MG/DL
HBA1C MFR BLD: 13.3 % (ref 4.8–5.6)
HCT VFR BLDA CALC: 48 % (ref 38–51)
HDLC SERPL-MCNC: 34 MG/DL (ref 40–60)
HGB BLDA-MCNC: 16.3 G/DL (ref 12–17)
HGB UR QL STRIP.AUTO: NEGATIVE
INR PPP: 1.02 (ref 0.91–1.09)
KETONES UR QL STRIP: ABNORMAL
LEUKOCYTE ESTERASE UR QL STRIP.AUTO: NEGATIVE
MAGNESIUM SERPL-MCNC: 2.1 MG/DL (ref 1.3–2.7)
NITRITE UR QL STRIP: NEGATIVE
PH UR STRIP.AUTO: <=5 [PH] (ref 5–8)
POTASSIUM BLD-SCNC: 3.7 MMOL/L (ref 3.5–5.5)
POTASSIUM BLDA-SCNC: 3.5 MMOL/L (ref 3.5–4.9)
PROT SERPL-MCNC: 6.7 G/DL (ref 5.7–8.2)
PROT UR QL STRIP: NEGATIVE
PROTHROMBIN TIME: 10.7 SECONDS (ref 9.6–11.5)
SODIUM BLD-SCNC: 137 MMOL/L (ref 132–146)
SODIUM BLDA-SCNC: 135 MMOL/L (ref 138–146)
SP GR UR STRIP: 1.04 (ref 1–1.03)
TRIGL SERPL-MCNC: 237 MG/DL (ref 0–150)
TROPONIN I SERPL-MCNC: 0.01 NG/ML
TROPONIN I SERPL-MCNC: 0.02 NG/ML
TROPONIN I SERPL-MCNC: 0.02 NG/ML
TSH SERPL DL<=0.05 MIU/L-ACNC: 3.54 MIU/ML (ref 0.35–5.35)
UROBILINOGEN UR QL STRIP: ABNORMAL

## 2018-02-14 PROCEDURE — 80061 LIPID PANEL: CPT | Performed by: FAMILY MEDICINE

## 2018-02-14 PROCEDURE — 84484 ASSAY OF TROPONIN QUANT: CPT | Performed by: FAMILY MEDICINE

## 2018-02-14 PROCEDURE — 97165 OT EVAL LOW COMPLEX 30 MIN: CPT

## 2018-02-14 PROCEDURE — 63710000001 INSULIN DETEMIR PER 5 UNITS: Performed by: FAMILY MEDICINE

## 2018-02-14 PROCEDURE — 82962 GLUCOSE BLOOD TEST: CPT

## 2018-02-14 PROCEDURE — 63710000001 INSULIN DETEMIR PER 5 UNITS: Performed by: INTERNAL MEDICINE

## 2018-02-14 PROCEDURE — 99223 1ST HOSP IP/OBS HIGH 75: CPT | Performed by: PSYCHIATRY & NEUROLOGY

## 2018-02-14 PROCEDURE — G0108 DIAB MANAGE TRN  PER INDIV: HCPCS

## 2018-02-14 PROCEDURE — 97116 GAIT TRAINING THERAPY: CPT

## 2018-02-14 PROCEDURE — 25010000002 ENOXAPARIN PER 10 MG: Performed by: INTERNAL MEDICINE

## 2018-02-14 PROCEDURE — 97110 THERAPEUTIC EXERCISES: CPT

## 2018-02-14 PROCEDURE — 81003 URINALYSIS AUTO W/O SCOPE: CPT | Performed by: FAMILY MEDICINE

## 2018-02-14 PROCEDURE — 99233 SBSQ HOSP IP/OBS HIGH 50: CPT | Performed by: INTERNAL MEDICINE

## 2018-02-14 PROCEDURE — 63710000001 INSULIN LISPRO (HUMAN) PER 5 UNITS: Performed by: FAMILY MEDICINE

## 2018-02-14 PROCEDURE — 97161 PT EVAL LOW COMPLEX 20 MIN: CPT

## 2018-02-14 PROCEDURE — 92523 SPEECH SOUND LANG COMPREHEN: CPT

## 2018-02-14 PROCEDURE — 94660 CPAP INITIATION&MGMT: CPT

## 2018-02-14 PROCEDURE — 94799 UNLISTED PULMONARY SVC/PX: CPT

## 2018-02-14 RX ORDER — LISINOPRIL 20 MG/1
20 TABLET ORAL
Status: DISCONTINUED | OUTPATIENT
Start: 2018-02-14 | End: 2018-02-14

## 2018-02-14 RX ORDER — LISINOPRIL 40 MG/1
40 TABLET ORAL
Status: DISCONTINUED | OUTPATIENT
Start: 2018-02-15 | End: 2018-02-15 | Stop reason: HOSPADM

## 2018-02-14 RX ADMIN — HYDROCODONE BITARTRATE AND ACETAMINOPHEN 1 TABLET: 5; 325 TABLET ORAL at 06:28

## 2018-02-14 RX ADMIN — INSULIN LISPRO 4 UNITS: 100 INJECTION, SOLUTION INTRAVENOUS; SUBCUTANEOUS at 11:46

## 2018-02-14 RX ADMIN — HYDROCODONE BITARTRATE AND ACETAMINOPHEN 1 TABLET: 5; 325 TABLET ORAL at 00:38

## 2018-02-14 RX ADMIN — INSULIN LISPRO 3 UNITS: 100 INJECTION, SOLUTION INTRAVENOUS; SUBCUTANEOUS at 08:31

## 2018-02-14 RX ADMIN — DOCUSATE SODIUM 100 MG: 100 CAPSULE, LIQUID FILLED ORAL at 21:31

## 2018-02-14 RX ADMIN — ATORVASTATIN CALCIUM 80 MG: 40 TABLET, FILM COATED ORAL at 21:31

## 2018-02-14 RX ADMIN — ATORVASTATIN CALCIUM 80 MG: 40 TABLET, FILM COATED ORAL at 00:26

## 2018-02-14 RX ADMIN — INSULIN LISPRO 6 UNITS: 100 INJECTION, SOLUTION INTRAVENOUS; SUBCUTANEOUS at 17:15

## 2018-02-14 RX ADMIN — FAMOTIDINE 40 MG: 20 TABLET, FILM COATED ORAL at 00:27

## 2018-02-14 RX ADMIN — DOCUSATE SODIUM 100 MG: 100 CAPSULE, LIQUID FILLED ORAL at 08:30

## 2018-02-14 RX ADMIN — FAMOTIDINE 40 MG: 20 TABLET, FILM COATED ORAL at 21:31

## 2018-02-14 RX ADMIN — INSULIN DETEMIR 8 UNITS: 100 INJECTION, SOLUTION SUBCUTANEOUS at 00:28

## 2018-02-14 RX ADMIN — INSULIN LISPRO 3 UNITS: 100 INJECTION, SOLUTION INTRAVENOUS; SUBCUTANEOUS at 21:31

## 2018-02-14 RX ADMIN — INSULIN DETEMIR 15 UNITS: 100 INJECTION, SOLUTION SUBCUTANEOUS at 21:31

## 2018-02-14 RX ADMIN — ASPIRIN 325 MG ORAL TABLET 325 MG: 325 PILL ORAL at 00:26

## 2018-02-14 RX ADMIN — LEVOTHYROXINE SODIUM 88 MCG: 88 TABLET ORAL at 05:57

## 2018-02-14 RX ADMIN — LISINOPRIL 20 MG: 20 TABLET ORAL at 08:30

## 2018-02-14 RX ADMIN — ENOXAPARIN SODIUM 40 MG: 40 INJECTION SUBCUTANEOUS at 14:18

## 2018-02-14 RX ADMIN — FLUOXETINE HYDROCHLORIDE 20 MG: 20 CAPSULE ORAL at 08:30

## 2018-02-14 RX ADMIN — HYDROCODONE BITARTRATE AND ACETAMINOPHEN 1 TABLET: 5; 325 TABLET ORAL at 18:42

## 2018-02-14 NOTE — H&P
[facility] Medicine Services  HISTORY AND PHYSICAL    Patient Name: Malissa Child  : 1950  MRN: [id number]  Primary Care Physician: Juan M Nieto MD    Subjective   Subjective     Chief Complaint:  Expressive aphasia and right sided weakness.     HPI:  Malissa Child is a 67 y.o. female with history of insulin-dependent T2DM, hyperlipidemia, ADELE and hypertension.  Patient was brought to the emergency department at [facility] via EMS after falling in her front yard.  Patient states she had previously been in physical therapy and noted that her right arm was numb from the elbow and a home.  Went home. Then she was walking her dog and getting her mail when she when noted the weakness in her right hand. She dropped the mail and was not able to pick it up. She then fell on the ground. Yelled for her neighbors, and when they came to help her she was having difficulty with word finding. EMS was called and patient was transported to [facility].  At time of arrival her expressive aphasia had significantly improved.  And the numbness and weakness in her right upper extremity had resolved.  MRI of the head was obtained and patient was found to have no acute infarct.  She was admitted for further work-up.     Patient has chronic history of right lower extremity weakness since undergoing a laminectomy in November.  She has history of lumbar stenosis with neurogenic claudication.  She has been in physical therapy due to falls since her surgery.  She states that she also has had persistent numbness in her right foot.  The numbness in the right upper leg and right upper extremity were new today.  She has had multiple falls since her surgery.  One resulting in an injury to her ribs.  And also a recent fall within the past week that resulted in head trauma.    Pt  passed with for multiple myeloma 7 months ago.  She has lived alone since that time.  She has 2 stepsons  one of which was living with her when her  was still living.  Currently has no contact with her stepsons.  Her friend from [place] is at bedside and provides a significant amount of history.          Review of Systems   Constitutional: Positive for fatigue. Negative for chills and fever.   HENT: Negative for congestion, ear pain, rhinorrhea and sore throat.    Respiratory: Positive for cough (mild for 1 week, nonproductive) and shortness of breath (SOA in PT at times ). Negative for wheezing.    Cardiovascular: Negative for chest pain, palpitations and leg swelling.   Gastrointestinal: Positive for constipation (noted Bright red blood with straining). Negative for abdominal pain, blood in stool, diarrhea, nausea and vomiting.   Genitourinary: Positive for dysuria (recurrent yeast infection ). Negative for hematuria.   Musculoskeletal: Positive for back pain (when standing has some pain in low back and radiates right thigh to the ankle. ).   Skin: Negative.    Neurological: Positive for dizziness (when turns head quickly ), speech difficulty, weakness and light-headedness. Negative for tremors, seizures, syncope and headaches.   Psychiatric/Behavioral: Positive for dysphoric mood (depressed at times ) and sleep disturbance (elavil does not help sleep. pain is keeping her awake ). The patient is not nervous/anxious.           Otherwise 10-system ROS reviewed and is negative except as mentioned in the HPI.    Personal History     Past Medical History:   Diagnosis Date   • Arthritis    • Back pain    • Breast cancer    • Depression    • Diabetes mellitus     dx 2000- does not check fsbs   • Disease of thyroid gland    • History of breast cancer    • Hyperlipidemia    • Hypertension    • Sleep apnea with use of continuous positive airway pressure (CPAP)    • Wears eyeglasses    • Wears partial dentures        Past Surgical History:   Procedure Laterality Date   • APPENDECTOMY     • BREAST  LUMPECTOMY Left 1995   • COLONOSCOPY      > 5 years ago   • KNEE CARTILAGE SURGERY Left    • LUMBAR LAMINECTOMY DISCECTOMY DECOMPRESSION N/A 11/24/2017    Procedure: LUMBAR LAMINECTOMY L4-5;  Surgeon: Arnold Del Rio MD;  Location: [facility];  Service:    • MASTECTOMY Left        Family History: family history includes Breast cancer in her mother; Cancer in her brother, father, maternal grandfather, maternal grandmother, and paternal grandfather; Dementia in her paternal grandmother; Lymphoma in her brother; Pancreatitis in her brother.     Social History:  reports that she has never smoked. She has never used smokeless tobacco. She reports that she drinks alcohol. She reports that she does not use illicit drugs.  Social History     Social History Narrative     in 2017, lives alone. Retired in June 2017. 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        Medications:  Prescriptions Prior to Admission   Medication Sig Dispense Refill Last Dose   • amitriptyline (ELAVIL) 25 MG tablet Take 25 mg by mouth Every Night.   Taking   • diclofenac (VOLTAREN) 75 MG EC tablet    Taking   • exemestane (AROMASIN) 25 MG chemo tablet Take 25 mg by mouth Daily.   Taking   • FLUoxetine (PROzac) 10 MG capsule Take 10 mg by mouth Daily.   Taking   • glimepiride (AMARYL) 4 MG tablet Take 4 mg by mouth Every Morning Before Breakfast.   Taking   • HYDROcodone-acetaminophen (NORCO) 5-325 MG per tablet    Taking   • ibuprofen (ADVIL,MOTRIN) 400 MG tablet Take 400 mg by mouth Daily As Needed for Mild Pain .   Taking   • JARDIANCE 25 MG tablet Take 25 mg by mouth Daily.   Taking   • levothyroxine (SYNTHROID) 88 MCG tablet Take 88 mcg by mouth Daily.   Taking   • lisinopril (PRINIVIL,ZESTRIL) 20 MG tablet Take 20 mg by mouth Daily.   Taking   • metFORMIN (GLUCOPHAGE) 1000 MG tablet Take 1,000 mg by mouth 2 (Two) Times a Day With Meals.   Taking   • naproxen sodium (ALEVE) 220 MG tablet Take 220 mg by mouth 2 (Two) Times a Day As  Needed for Mild Pain .   Taking   • pravastatin (PRAVACHOL) 40 MG tablet Take 40 mg by mouth Daily.   Taking       Allergies   Allergen Reactions   • Cat Hair Extract    • Dust Mite Extract    • Mold Extract [Trichophyton]        Objective   Objective     Vital Signs:   Temp:  [98.9 °F (37.2 °C)] 98.9 °F (37.2 °C)  Heart Rate:  [107-115] 111  Resp:  [16] 16  BP: (149-189)/(100-125) 149/125   Total (NIH Stroke Scale): 0    Physical Exam   Constitutional: She is oriented to person, place, and time. She appears well-nourished. No distress.   HENT:   Head: Atraumatic.   Right Ear: External ear normal.   Left Ear: External ear normal.   Eyes: Conjunctivae and EOM are normal. Pupils are equal, round, and reactive to light. Right eye exhibits no discharge. Left eye exhibits no discharge.   Neck: Normal range of motion. Neck supple. Carotid bruit is not present. No tracheal deviation present. No thyromegaly present.   Cardiovascular: Regular rhythm.   Occasional extrasystoles are present. Tachycardia present.    No murmur heard.  Pulmonary/Chest: Effort normal and breath sounds normal. She has no wheezes. She has no rales.   Abdominal: Soft. Bowel sounds are normal. She exhibits no distension. There is no tenderness.   Musculoskeletal: She exhibits no edema.   Equal muscle strength in upper extremities.  Muscle strength is  decreased in lower extremities by laterally but noted to be worse on the right side.   Neurological: She is alert and oriented to person, place, and time. She displays no tremor and normal reflexes. A sensory deficit (decreased sensation on right lower ext ) is present. No cranial nerve deficit. Gait (reported by ED ) abnormal.   Skin: No rash noted. She is not diaphoretic.   Psychiatric: Her behavior is normal. Judgment normal. Her affect is blunt. Her speech is delayed (mild hesitation in speech since getting IV ativan in ED ). Cognition and memory are impaired (some difficult with recall ). She  exhibits a depressed mood. She expresses no suicidal ideation.   Nursing note and vitals reviewed.         Results Reviewed:  I have personally reviewed current lab, radiology, and data and agree.      Results from last 7 days  Lab Units 02/13/18  1702   WBC 10*3/mm3 11.96*   HEMOGLOBIN g/dL 15.9*   HEMATOCRIT % 44.4*   PLATELETS 10*3/mm3 289   INR  1.0           Invalid input(s):  ALKPHOS, TROPONININT  CrCl cannot be calculated (No order found.).  Brief Urine Lab Results     None        No results found for: BNP  No results found for: PHART  Imaging Results (last 24 hours)     Procedure Component Value Units Date/Time    CT Head Without Contrast Stroke Protocol [[id number]] Updated:  02/13/18 1652    CT Cerebral Perfusion With & Without Contrast [[id number]] Updated:  02/13/18 1711    CT Angiogram Neck With & Without Contrast [[id number]] Updated:  02/13/18 1711    CT Angiogram Head With & Without Contrast [[id number]] Updated:  02/13/18 1711    XR Chest 1 View [[id number]] Updated:  02/13/18 1725    MRI Brain Without Contrast [[id number]] Collected:  02/13/18 1835     Updated:  02/13/18 2049    Narrative:       EXAM:    MR Head Without Intravenous Contrast    CLINICAL HISTORY:    67 years old, female; Signs and symptoms; Weakness, extremity; Bilateral;   Patient HX: Hypertension. At 1400 the patient suddenly developed word finding   difficulties, the patient reported to ems that she felt weaker on the right   side. At the ed she states that she fell just under a week ago and hit her   right lower chest, hurting her ribcage. She said she did fall a few weeks ago   and did hit head no HX of surgery to head HX of breast cancer; Additional info:   Stroke    TECHNIQUE:    Magnetic resonance images of the head/brain without intravenous contrast in   multiple planes.    COMPARISON:    CT ANGIOGRAM HEAD W WO CONTRAST 2018-02-13 17:01    FINDINGS:    There is no mass or mass effect.      There is no shift of midline structures.       There is ventricular prominence related to parenchymal volume loss.    The ventricular system is otherwise without acute change.      There is no evidence of an acute ischemic or hemorrhagic event.      There are patchy areas of increased T2 signal in the periventricular white   matter.      The gray/white margin is otherwise maintained.      There are no other areas of abnormal signal.      The sulci are prominent over both cerebral convexities.      There are no abnormal extra-axial fluid collections.      Impression:         Periventricular white matter changes consistent with chronic microvascular   ischemia.    Generalized cerebral atrophy.    Otherwise, no acute changes within the brain.    THIS DOCUMENT HAS BEEN ELECTRONICALLY SIGNED BY HEIDE FRENCH MD             Assessment/Plan   Assessment / Plan         Assessment:  Principal Problem:    Transient cerebral ischemia  Active Problems:    Expressive aphasia    Right hemiparesis    Lumbar stenosis with neurogenic claudication    ADELE on CPAP    Polycythemia    DM (diabetes mellitus), type 2, uncontrolled    Diabetic peripheral neuropathy associated with type 2 diabetes mellitus    History of atrial fibrillation without current medication    Hyperlipidemia LDL goal <70    Essential hypertension    Personal history of breast cancer    Recurrent major depressive disorder      Plan:  -Patient is a 67-year-old insulin dependent diabetic female with past history of atrial fibrillation and breast cancer.  Who recently underwent Lumbar Laminectomy Discectomy Decompression.  Patient now with acute TIA symptoms with expressive aphasia and right-sided weakness.  That had almost resolved after arriving to the emergency department.   -Admit to telemetry. Currently no evidence of a fib. Do neuro checks every 4 hours.  -Start aspirin 325 mg and High dose statin  -Obtain carotid Doppler and 2-D echocardiogram  -Resume basal insulin.  Patient recently stopped Lantus  after the death of her  approximately 7 months ago. Hold oral agents for now.   -Consult Neurology, Speech, PT, and OT.    -continue CPAP  -Will give IV Labetalol for BP for now. Check renal function before restarting lisinopril.   -She is currently on an aromatase inhibitor secondary to breast cancer.   -Increase prozac to 20 mg, as she reports being significantly depressed.     DVT prophylaxis: TEDs and SCDs    CODE STATUS:  Full Code    Admission Status:  I believe this patient meets INPATIENT status due to the need for care which can only be reasonably provided in an hospital setting such as aggressive/expedited ancillary services and/or consultation services, the necessity for IV medications, close physician monitoring and/or the possible need for procedures.  In such, I feel patient’s risk for adverse outcomes and need for care warrant INPATIENT evaluation and predict the patient’s care encounter to likely last beyond 2 midnights.      Electronically signed by Shanon Lacy DO, 02/13/18, 9:36 PM.

## 2018-02-14 NOTE — CONSULTS
"Diabetes Education  Assessment/Teaching    Patient Name:  Malissa Child  YOB: 1950  MRN: [id number]  Admit Date:  2/13/2018      Assessment Date:  2/14/2018       Most Recent Value    General Information      Referral From:  A1c, Blood glucose, MD order    Height  167.6 cm (66\")    Height Method  Stated    Weight  69.9 kg (154 lb)    Weight Method  Stated    Pregnancy Assessment     Diabetes History     What type of diabetes do you have?  Type 2    Length of Diabetes Diagnosis  10 + years    Current DM knowledge  poor    Have you had diabetes education/teaching in the past?  no [Pt advised to contact the [facility] for a class. ]    Do you test your blood sugar at home?  no    Do you have any diabetes complications?  circulation problems    Education Preferences     What areas of diabetes would you like to learn about?  avoiding high blood sugar, avoiding low blood sugar, diabetes complications, diet information, exercise information, medications for diabetes, understanding diabetes, testing my blood sugar at home, stress/coping skills, resources on diabetes    Nutrition Information     What is the biggest challenge you have with your diet?  Poor choices, Knowledge    Assessment Topics     Healthy Eating - Assessment  Needs education    Being Active - Assessment  Needs education    Taking Medication - Assessment  Needs education    Problem Solving - Assessment  Needs education    Reducing Risk - Assessment  Competent    Healthy Coping - Assessment  Needs education    Monitoring - Assessment  Needs education    DM Goals     Healthy Eating - Goal  0-7 days from discharge    Being Active - Goal  0-30 days from discharge    Taking Medication - Goal  0-7 days from discharge    Problem Solving - Goal  0-7 days from discharge    Healthy Coping - Goal  0-7 days from discharge    Monitoring - Goal  0-7 days from discharge    Contact Plan  Offered/declined               Most Recent Value    DM " Education Needs     Meter  Needs meter    Meter Type  Contour    Frequency of Testing  2 times a day    Blood Glucose Target Range      Medication  Oral, Insulin    Problem Solving  Hypoglycemia, Hyperglycemia, Sick days, Signs, Symptoms, Treatment    Reducing Risks  A1C testing, Blood pressure, Eye exam, Immunizations, Cardiovascular    Healthy Eating  Reviewed meal plan    Physical Activity  Walking    Physical Activity Frequency  Occasionally    Healthy Coping  Appropriate    Motivation  Moderate    Teaching Method  Explanation, Discussion, Handouts, Teach back    Patient Response  Verbalized understanding            Other Comments:    Education completed with patient. Pt stated that she stopped taking her insulin about 6 months ago and has been very stressed. Has a family hx of DM, and has not been checking FSBG at home. Pt educated on the importance of taking medications as prescribed and also the importance of monitoring. Pt provided with a complementary Contour Next meter and advised to consult with PCP for further instructions.  Discussed and taught patient about type 2 diabetes self-management, risk factors, and importance of blood glucose control to reduce complications. Target blood glucose readings and A1c goals per ADA were reviewed. Reviewed with patient current A1c and discussed its significance. Signs, symptoms and treatment of hyperglycemia and hypoglycemia were discussed. Lifestyle changes such as physical activity with MD approval and healthy eating were encouraged. Stressed the importance of strict blood sugar control to prevent complications.  Pt instructed to  check blood sugar 4 times per day and to call PCP if  Blood glucose is higher than 180 two times or more.  Patient was encouraged to keep record of blood glucose readings to take to follow up appointment with PCP.  Pt was offered a free op follow up appointment however declined at this time, but will contact local health department  for a class.          Electronically signed by:  Kings Cornejo RN  02/14/18 10:01 AM

## 2018-02-14 NOTE — THERAPY EVALUATION
Acute Care - Speech Language Pathology Initial Evaluation  [facility]   Cognitive-Communication Evaluation     Patient Name: Malissa Child  : 1950  MRN: [id number]  Today's Date: 2018  Onset of Illness/Injury or Date of Surgery Date: 18  Date of Referral to SLP: 18         Admit Date: 2018     Visit Dx:    ICD-10-CM ICD-9-CM   1. Transient cerebral ischemia, unspecified type G45.9 435.9   2. Impaired mobility and ADLs Z74.09 799.89     Patient Active Problem List   Diagnosis   • Lumbar stenosis with neurogenic claudication   • ADELE on CPAP   • Transient cerebral ischemia   • Polycythemia   • DM (diabetes mellitus), type 2, uncontrolled   • Diabetic peripheral neuropathy associated with type 2 diabetes mellitus   • History of atrial fibrillation without current medication   • Hyperlipidemia LDL goal <70   • Essential hypertension   • Personal history of breast cancer   • Expressive aphasia   • Right hemiparesis   • Recurrent major depressive disorder     Past Medical History:   Diagnosis Date   • Arthritis    • Back pain    • Breast cancer     left breast, reoccurance times 3   • Depression    • Diabetes mellitus     dx - does not check fsbs   • Disease of thyroid gland    • DM (diabetes mellitus), type 2, uncontrolled 2018   • History of atrial fibrillation    • History of atrial fibrillation without current medication 2018   • History of breast cancer    • Hyperlipidemia    • Hypertension    • Lumbar stenosis with neurogenic claudication 2017    Added automatically from request for surgery [id number]   • Sleep apnea with use of continuous positive airway pressure (CPAP)    • Wears eyeglasses    • Wears partial dentures      Past Surgical History:   Procedure Laterality Date   • APPENDECTOMY     • BREAST LUMPECTOMY Left    • BREAST LUMPECTOMY Left    • BREAST SURGERY Left     mastectomy and reconstruction   • COLONOSCOPY      > 5 years ago   •  COLONOSCOPY  2010?   • KNEE CARTILAGE SURGERY Left    • LUMBAR LAMINECTOMY DISCECTOMY DECOMPRESSION N/A 11/24/2017    Procedure: LUMBAR LAMINECTOMY L4-5;  Surgeon: Arnold Del Rio MD;  Location: [facility] OR;  Service:    • MASTECTOMY Left           SLP EVALUATION (last 72 hours)      SLP Evaluation       02/14/18 0930                Rehab Evaluation    Document Type evaluation  -RD        Subjective Information no complaints;agree to therapy  -RD        Patient Effort, Rehab Treatment good  -RD        General Information    Patient Profile Review yes  -RD        Onset of Illness/Injury 02/13/18  -RD        Subjective Patient Observations alert and cooperative, pt reports transient word-finding difficulty that started ~1 week ago.   -RD        Pertinent History Of Current Problem Adm w/ TIA, Passed RN dys screen. Per stroke protocol.   -RD        Current Diet Limitations regular solid;thin liquids  -RD        Precautions/Limitations, Vision WFL with corrective lenses  -RD        Precautions/Limitations, Hearing WFL;other (see comments)   for purposes of eval  -RD        Prior Level of Function- Communication functional in all spheres  -RD        Prior Level of Function- Swallowing no diet consistency restrictions  -RD        Plans/Goals Discussed With patient;agreed upon  -RD        Barriers to Rehab none identified  -RD        Living Environment    Lives With alone  -RD        Living Arrangements house  -RD        Clinical Impression    Date of Referral to SLP 02/13/18  -RD        SLP Diagnosis WFL.  -RD        Functional Level At Time Of Evaluation WFL  -RD        Patient's Goals For Discharge return to all previous roles/activities  -RD        Criteria for Skilled Therapeutic Interventions Met no problems identified which require skilled intervention;current level of function same as previous level of function  -RD        Therapy Frequency evaluation only  -RD        Pain Assessment    Pain Assessment Corbin-Baker  FACES  -RD        Corbin-Baker FACES Pain Rating 2  -RD        Cognitive Assessment/Intervention    Current Cognitive/Communication Assessment functional  -RD        Orientation Status oriented x 4  -RD        Follows Commands/Answers Questions 100% of the time;able to follow multi-step instructions  -RD        Short/Long Term Memory mild impairment, short term memory;intact long term memory  -RD        Additional Documentation Cognitive Assessment Intervention (Group)  -RD        Cognitive Assessment Intervention    Behavior/Mood Observations behavior appropriate to situation, WNL/WFL;alert;cooperative  -RD        Attention WNL/WFL  -RD        Pragmatics WNL/WFL  -RD        Problem Solving WNL/WFL  -RD        Executive Function Skills WNL/WFL  -RD        Reasoning WNL/WFL  -RD        Diffuse Language Characteristics no concerns, diffuse language characteristics  -RD        Communication Assessment/Intervention    Additional Documentation Auditory Comprehen Assess/Intervention (Group);Reading Assessment/Intervention (Group);Verbal Expression Assess/Intervention (Group);Writing Assessment/Intervention (Group);Motor Speech Assessment/Intervention (Group)  -RD        Auditory Comprehen Assess/Intervention    Auditory Comprehension WNL/WFL  -RD        Auditory Comprehen Assess/Intervention    Able to Identify Objects WNL/WFL  -RD        Able to Identify Pictures WNL/WFL;successful, multiple pictures  -RD        Answers Yes/No Questions WNL/WFL;successful, complex questions  -RD        Able to Follow Commands WNL/WFL;success, 4 or more step commands  -RD        Verbal Expression Assess/Intervention    Automatic Speech WNL/WFL;successful, days of the week  -RD        Speech Repetition WNL/WFL  -RD        Speech Fluency fluent speech  -RD        Conversational Speech WNL/WFL  -RD        Conversational Speech Comment Pt w/ word-finding difficulty upon admit, but this has resolved and pt has returned to baseline  -RD         Reading Assessment/Intervention    Reading Skills WNL/WFL  -RD        Oral Reading Ability WNL/WFL  -RD        Reading Comprehension WNL/WFL  -RD        Writing Assessment/Intervention    Writing Skills WNL/WFL  -RD        Motor Speech Assess/Intervention    Motor Speech-Apraxia Observations no concerns  -RD        Motor Speech- Apraxia WNL/WFL  -RD        Motor Speech-Dysarthria Observations no concerns  -RD        Motor Speech- Dysarthria WNL/WFL  -RD          User Key  (r) = Recorded By, (t) = Taken By, (c) = Cosigned By    Initials Name Effective Dates    COSTA Duff, MS CCC-SLP 09/27/17 -            EDUCATION  The patient has been educated in the following areas:   Cognitive Impairment Communication Impairment.    SLP Recommendation and Plan  SLP Diagnosis: WFL.        Criteria for Skilled Therapeutic Interventions Met: no problems identified which require skilled intervention, current level of function same as previous level of function        Therapy Frequency: evaluation only          Plan of Care Review  Plan Of Care Reviewed With: patient  Progress: improving (initial Cog/comm eval)  Outcome Summary/Follow up Plan: Cog/comm eval complete. Per stroke protocol. Mild short term memory deficit identified that is at baseline per pt, otherwise cognitive-linguistic skills were functional. Word-finding deficit previously reported by pt has resolved. Expressive and receptive language, reading and writing were all found to be WFL. No dysarthria or apraxia. Speech was 100% intelligible in conversation. Pt passed RN dysphagia screen and reports no swallowing concerns. RECS: No further ST needs at this time.             Time Calculation:         Time Calculation- SLP       02/14/18 1510          Time Calculation- SLP    SLP Start Time 0930  -      SLP Received On 02/14/18  -        User Key  (r) = Recorded By, (t) = Taken By, (c) = Cosigned By    Initials Name Provider Type    COSTA Duff MS  CCC-SLP Speech and Language Pathologist          Therapy Charges for Today     Code Description Service Date Service Provider Modifiers Qty    [id number]  ST EVAL SPEECH AND PROD W LANG  5 2/14/2018 Sandy Duff, MS CCC-SLP GN 1                     Sandy Duff, MS HURT-SLP  2/14/2018

## 2018-02-14 NOTE — THERAPY EVALUATION
Acute Care - Physical Therapy Initial Evaluation  [facility]     Patient Name: Malissa Child  : 1950  MRN: [id number]  Today's Date: 2018   Onset of Illness/Injury or Date of Surgery Date: 18  Date of Referral to PT: 18  Referring Physician: DO EDIL      Admit Date: 2018     Visit Dx:    ICD-10-CM ICD-9-CM   1. Transient cerebral ischemia, unspecified type G45.9 435.9     Patient Active Problem List   Diagnosis   • Lumbar stenosis with neurogenic claudication   • ADELE on CPAP   • Transient cerebral ischemia   • Polycythemia   • DM (diabetes mellitus), type 2, uncontrolled   • Diabetic peripheral neuropathy associated with type 2 diabetes mellitus   • History of atrial fibrillation without current medication   • Hyperlipidemia LDL goal <70   • Essential hypertension   • Personal history of breast cancer   • Expressive aphasia   • Right hemiparesis   • Recurrent major depressive disorder     Past Medical History:   Diagnosis Date   • Arthritis    • Back pain    • Breast cancer     left breast, reoccurance times 3   • Depression    • Diabetes mellitus     dx - does not check fsbs   • Disease of thyroid gland    • DM (diabetes mellitus), type 2, uncontrolled 2018   • History of atrial fibrillation    • History of atrial fibrillation without current medication 2018   • History of breast cancer    • Hyperlipidemia    • Hypertension    • Lumbar stenosis with neurogenic claudication 2017    Added automatically from request for surgery [id number]   • Sleep apnea with use of continuous positive airway pressure (CPAP)    • Wears eyeglasses    • Wears partial dentures      Past Surgical History:   Procedure Laterality Date   • APPENDECTOMY     • BREAST LUMPECTOMY Left    • BREAST LUMPECTOMY Left    • BREAST SURGERY Left     mastectomy and reconstruction   • COLONOSCOPY      > 5 years ago   • COLONOSCOPY  ?   • KNEE CARTILAGE SURGERY Left    • LUMBAR  LAMINECTOMY DISCECTOMY DECOMPRESSION N/A 11/24/2017    Procedure: LUMBAR LAMINECTOMY L4-5;  Surgeon: Arnold Del Rio MD;  Location: [facility] OR;  Service:    • MASTECTOMY Left           PT ASSESSMENT (last 72 hours)      PT Evaluation       02/14/18 0844 02/13/18 2300    Rehab Evaluation    Document Type evaluation  -CD     Subjective Information agree to therapy;complains of;pain;weakness   CHRONIC LBP   -CD     Patient Effort, Rehab Treatment excellent  -CD     Symptoms Noted During/After Treatment fatigue  -CD     General Information    Patient Profile Review yes  -CD     Onset of Illness/Injury or Date of Surgery Date 02/13/18  -CD     Referring Physician EDIL, DO  -CD     General Observations PT SUPINE UPON ARRIVAL ON RA AND WITH IV HEPLOCKED.   -CD     Pertinent History Of Current Problem PT TO ED WITH R SIDED WEAKNESS, NUMBNESS, WORD FINDING DIFFICULTY AND A FALL IN HER FRONT YARD. MRI WAS NEG. PT HAD LAMINECTOMY IN NOVEMBER 2017 AND HAS HAD MILD R LE WEAKNESS AND MULTIPLE FALLS SINCE SURGERY. PT WAS GETTING OPPT PTA DUE TO FALLS.   -CD     Precautions/Limitations fall precautions   B LE DF WEAKNESS 2/5. MILD WEAKNESS R HIP/KNEE.   -CD     Prior Level of Function independent:;all household mobility;community mobility;ADL's;cleaning;using stairs;driving   MULTIPLE FALLS, NOT CLEANING THOROUGHLY DUE TO DIFFICULTY.   -CD     Equipment Currently Used at Home cane, straight;walker, rolling   ROLLATOR.   -CD     Plans/Goals Discussed With patient;agreed upon  -CD     Risks Reviewed patient:;LOB;dizziness;increased discomfort;change in vital signs  -CD     Benefits Reviewed patient:;improve function;increase independence;increase strength;increase balance;increase knowledge;decrease pain  -CD     Barriers to Rehab medically complex;previous functional deficit  -CD     Living Environment    Lives With alone  -CD alone  -BC    Living Arrangements house  -CD house  -BC    Home Accessibility tub/shower is not walk  in;stairs to enter home  -CD no concerns  -BC    Number of Stairs to Enter Home 3  -CD     Stair Railings at Home outside, present at both sides  -CD none  -BC    Type of Financial/Environmental Concern  none  -BC    Transportation Available  car;family or friend will provide  -BC    Living Environment Comment PT USES ROLLATOR IN HOME AND CANE IN COMMUNITY BUT HAS BEEN FALLING FREQUENTLY. HAS A DOG SHE TAKES OUT ON A LEASH WHICH HAS PULLED HER DOWN RECENTLY. PT REPORTS DIFFICULTY MOPPING AND SWEEPING HER HOME AND HAS TO DO IN A SEATED POSITION FROM A W/C DUE TO RISK OF FALLING. DOES NOT HAVE ANY ASSIST AT HOME OR ANYONE SHE IS COMFORTABLE TO CALL ON. PT'S  PASSED AWAY 7 MONTHS AGO. PT WAS GETTING OPPT PTA TO WORK ON STRENGTHENING AND BALANCE. ALL INFORMATION WAS RELAYED TO DR ANAYA AND CM.   -CD     Clinical Impression    Date of Referral to PT 02/13/18  -CD     PT Diagnosis TIA, IMPAIRED FUNCTIONAL MOBILITY, FREQUENT FALLS.   -CD     Patient/Family Goals Statement SUGGESTED IRF AT D/C BUT PT PREFERS HHPT WITH TRANSITION BACK TO OPPT.   -CD     Criteria for Skilled Therapeutic Interventions Met yes  -CD     Rehab Potential good, to achieve stated therapy goals  -CD     Vital Signs    Pre Systolic BP Rehab 151  -CD     Pre Treatment Diastolic BP 91  -CD     Post Systolic BP Rehab 155  -CD     Post Treatment Diastolic BP 92  -CD     Pretreatment Heart Rate (beats/min) 104  -CD     Posttreatment Heart Rate (beats/min) 113  -CD     Pain Assessment    Pain Assessment 0-10  -CD     Pain Score 4  -CD     Post Pain Score 4  -CD     Pain Type Chronic pain  -CD     Pain Location Back  -CD     Pain Orientation Lower  -CD     Pain Intervention(s) Medication (See MAR);Repositioned;Ambulation/increased activity   NSG PREMEDICATED.   -CD     Response to Interventions TOELRATED.   -CD     Vision Assessment/Intervention    Vision Comment DEFERRED TO O.T. PT TRACKING IN ROOM.   -CD     Cognitive Assessment/Intervention     Current Cognitive/Communication Assessment functional  -CD     Orientation Status oriented x 4  -CD     Follows Commands/Answers Questions 100% of the time;able to follow single-step instructions  -CD     Personal Safety mild impairment;decreased insight to deficits;decreased awareness, need for safety;decreased awareness, need for assist  -CD     Personal Safety Interventions fall prevention program maintained;gait belt;muscle strengthening facilitated;nonskid shoes/slippers when out of bed;supervised activity  -CD     ROM (Range of Motion)    General ROM no range of motion deficits identified   PROM FOR FULL ANKLE DF B.   -CD     MMT (Manual Muscle Testing)    General MMT Assessment lower extremity strength deficits identified   R HIP 3/5, KNEE 4/5. B DF 2/5.   -CD     Bed Mobility, Assessment/Treatment    Bed Mobility, Assistive Device bed rails;head of bed elevated  -CD     Bed Mob, Supine to Sit, Story supervision required  -CD     Transfer Assessment/Treatment    Transfers, Sit-Stand Story verbal cues required;minimum assist (75% patient effort)  -CD     Transfers, Stand-Sit Story contact guard assist;verbal cues required  -CD     Transfers, Sit-Stand-Sit, Assist Device rolling walker  -CD     Toilet Transfer, Story minimum assist (75% patient effort)  -CD     Toilet Transfer, Assistive Device elevated toilet seat;rolling walker   GRAB BAR IN BATHROOM.   -CD     Transfer, Safety Issues step length decreased;loses balance backward  -CD     Transfer, Impairments strength decreased;impaired balance;sensation decreased  -CD     Transfer, Comment CUES FOR HAND PLACEMENT AND SAFETY WITH R WALKER.   -CD     Gait Assessment/Treatment    Gait, Story Level contact guard assist  -CD     Gait, Assistive Device rolling walker  -CD     Gait, Distance (Feet) 250  -CD     Gait, Gait Deviations jamie decreased;step length decreased;decreased heel strike;toe-to-floor clearance  decreased;weight-shifting ability decreased   ER'S AND MILD STEPPAGE GAIT TO CLEAR TOES.   -CD     Gait, Safety Issues step length decreased;balance decreased during turns  -CD     Gait, Impairments strength decreased;impaired balance;sensory feedback impaired  -CD     Gait, Comment CUES FOR SAFETY AND TO NOT STEP OUT BEYOND WALKER. RECOMMEND TRIAL OF B AFO'S NEXT VISIT AS PT LIVES ALONE AND HAS BEEN FALLING.   -CD     Motor Skills/Interventions    Additional Documentation Balance Skills Training (Group)  -CD     Balance Skills Training    Sitting-Level of Assistance Independent  -CD     Sitting-Balance Support Feet supported  -CD     Standing-Level of Assistance Contact guard  -CD     Static Standing Balance Support assistive device  -CD     Gait Balance-Level of Assistance Contact guard  -CD     Gait Balance Support assistive device  -CD     Sensory Assessment/Intervention    Light Touch RLE   IMPAIRED R LE,  BURNING SENSATION DORSAL ASPECT OF B FEET  -CD     Positioning and Restraints    Pre-Treatment Position in bed  -CD     Post Treatment Position chair   WILL DELIVER B AFO'S TO ROOM FOR NEXT VISIT.   -CD     In Chair reclined;notified nsg;call light within reach;encouraged to call for assist;exit alarm on;legs elevated   DISCUSSED CURRENT MOBILITY STATUS AND REC'S WITH MD/SKYLAR.   -CD       02/13/18 8158       General Information    Equipment Currently Used at Home none  -       User Key  (r) = Recorded By, (t) = Taken By, (c) = Cosigned By    Initials Name Provider Type    CD Nasima Resendiz, SANDY Physical Therapist     Josefa Huerta, RN Registered Nurse    CHARLEY Walton, RN Registered Nurse          Physical Therapy Education     Title: PT OT SLP Therapies (Done)     Topic: Physical Therapy (Done)     Point: Mobility training (Done)    Learning Progress Summary    Learner Readiness Method Response Comment Documented by Status   Patient Acceptance EAMA,JANINE MA OF OOB ACTIVITY, SAFETY WITH MOBILITY,  GAIT TRAINING WITH R WALKER, BENEFITS OF TRIAL OF AFO'S DUE TO B DF WEAKNESS, REC'S FOR APPROPRIATE A.D. AT D/C. D/C PLANNING.  02/14/18 1026 Done               Point: Home exercise program (Done)    Learning Progress Summary    Learner Readiness Method Response Comment Documented by Status   Patient Acceptance E,D VU,NR BENFITS OF OOB ACTIVITY, SAFETY WITH MOBILITY, GAIT TRAINING WITH R WALKER, BENEFITS OF TRIAL OF AFO'S DUE TO B DF WEAKNESS, REC'S FOR APPROPRIATE A.D. AT D/C. D/C PLANNING.  02/14/18 1026 Done               Point: Body mechanics (Done)    Learning Progress Summary    Learner Readiness Method Response Comment Documented by Status   Patient Acceptance E,D VU,NR BENFITS OF OOB ACTIVITY, SAFETY WITH MOBILITY, GAIT TRAINING WITH R WALKER, BENEFITS OF TRIAL OF AFO'S DUE TO B DF WEAKNESS, REC'S FOR APPROPRIATE A.D. AT D/C. D/C PLANNING.  02/14/18 1026 Done               Point: Precautions (Done)    Learning Progress Summary    Learner Readiness Method Response Comment Documented by Status   Patient Acceptance E,D VU,NR BENFITS OF OOB ACTIVITY, SAFETY WITH MOBILITY, GAIT TRAINING WITH R WALKER, BENEFITS OF TRIAL OF AFO'S DUE TO B DF WEAKNESS, REC'S FOR APPROPRIATE A.D. AT D/C. D/C PLANNING.  02/14/18 1026 Done                      User Key     Initials Effective Dates Name Provider Type Discipline     06/19/15 -  Nasima Resendiz, PT Physical Therapist PT                PT Recommendation and Plan  Anticipated Equipment Needs At Discharge: tub bench, bedside commode  Anticipated Discharge Disposition: inpatient rehabilitation facility (P.T. RECOMMENDS IRF, IF PT WILL NOT AGREE, REC HHPT. )  Planned Therapy Interventions: balance training, gait training, strengthening, transfer training, stair training, home exercise program (TRIAL OF GAIT WITH B AFO'S. )  PT Frequency: daily  Plan of Care Review  Plan Of Care Reviewed With: patient  Outcome Summary/Follow up Plan: PT PRESENTS WITH EVOLVING SYMPTOMS  TO INCLUDE MILD R LE WEAKNESS HIP/KNEE, SIGNIFICANT WEAKNESS B DF, IMPAIRED BALANCE, UNSTEADY GAIT AND DECLINE IN FUNCTIONAL MOBILITY. PT REPORTS MULTIPLE FALLS RECENTLY . RECOMMEND TRIAL OF GAIT WITH B AFO'S AND USE OF REG ROLLING WX VS ROLLATOR OR CANE. PT WOULD BENEFIT FROM IRF STAY AS SHE LIVES ALONE BUT CURRENTLY PREFERS THE IDEA OF GOING HOME WITH HHPT WITH TRANSITION BACK TO OPPT.           IP PT Goals       02/14/18 1028          Transfer Training PT LTG    Transfer Training PT LTG, Time to Achieve 2 wks  -CD      Transfer Training PT LTG, Activity Type all transfers  -CD      Transfer Training PT LTG, [place] Level independent  -CD      Transfer Training PT LTG, Assist Device walker, rolling  -CD      Gait Training PT LTG    Gait Training Goal PT LTG, Time to Achieve 2 wks  -CD      Gait Training Goal PT LTG, [place] Level independent  -CD      Gait Training Goal PT LTG, Assist Device walker, rolling  -CD      Gait Training Goal PT LTG, Distance to Achieve 600  -CD      Gait Training Goal PT LTG, Additional Goal WITH B AFO'S IF TOLERATED.   -CD      Stair Training PT LTG    Stair Training Goal PT LTG, Time to Achieve 2 wks  -CD      Stair Training Goal PT LTG, Number of Steps 3  -CD      Stair Training Goal PT LTG, [place] Level independent  -CD      Stair Training Goal PT LTG, Assist Device 2 handrails  -CD        User Key  (r) = Recorded By, (t) = Taken By, (c) = Cosigned By    Initials Name Provider Type    CD Nasima Resendiz, PT Physical Therapist                Outcome Measures       02/14/18 0882          How much help from another person do you currently need...    Turning from your back to your side while in flat bed without using bedrails? 4  -CD      Moving from lying on back to sitting on the side of a flat bed without bedrails? 4  -CD      Moving to and from a bed to a chair (including a wheelchair)? 3  -CD      Standing up from a chair using your arms (e.g., wheelchair, bedside  chair)? 3  -CD      Climbing 3-5 steps with a railing? 2  -CD      To walk in hospital room? 3  -CD      AM-PAC 6 Clicks Score 19  -CD      Modified [place] Scale    Modified [place] Scale 3 - Moderate disability.  Requiring some help, but able to walk without assistance.   WITH ROLLING WALKER.   -CD      Functional Assessment    Outcome Measure Options AM-PAC 6 Clicks Basic Mobility (PT);Modified [place]  -CD        User Key  (r) = Recorded By, (t) = Taken By, (c) = Cosigned By    Initials Name Provider Type    THAD Resendiz, PT Physical Therapist           Time Calculation:         PT Charges       02/14/18 1034          Time Calculation    Start Time 0844  -CD      PT Received On 02/14/18  -      PT Goal Re-Cert Due Date 02/24/18  -      Time Calculation- PT    Total Timed Code Minutes- PT 60 minute(s)  -CD        User Key  (r) = Recorded By, (t) = Taken By, (c) = Cosigned By    Initials Name Provider Type    THAD Resendiz, PT Physical Therapist          Therapy Charges for Today     Code Description Service Date Service Provider Modifiers Qty    71310920249 HC GAIT TRAINING EA 15 MIN 2/14/2018 Nasima Resendiz, PT GP 1    18145564767 HC PT EVAL LOW COMPLEXITY 4 2/14/2018 Nasima Resendiz, PT GP 1    41556158049 HC PT THER PROC EA 15 MIN 2/14/2018 Nasima Resendiz, PT GP 2          PT G-Codes  Outcome Measure Options: AM-PAC 6 Clicks Basic Mobility (PT), Modified Joe Resendiz, PT  2/14/2018

## 2018-02-14 NOTE — THERAPY EVALUATION
Acute Care - Occupational Therapy Initial Evaluation  [facility]     Patient Name: Malissa Child  : 1950  MRN: [id number]  Today's Date: 2018  Onset of Illness/Injury or Date of Surgery Date: 18  Date of Referral to OT: 18  Referring Physician:  DO Bambi    Admit Date: 2018       ICD-10-CM ICD-9-CM   1. Transient cerebral ischemia, unspecified type G45.9 435.9   2. Impaired mobility and ADLs Z74.09 799.89     Patient Active Problem List   Diagnosis   • Lumbar stenosis with neurogenic claudication   • ADELE on CPAP   • Transient cerebral ischemia   • Polycythemia   • DM (diabetes mellitus), type 2, uncontrolled   • Diabetic peripheral neuropathy associated with type 2 diabetes mellitus   • History of atrial fibrillation without current medication   • Hyperlipidemia LDL goal <70   • Essential hypertension   • Personal history of breast cancer   • Expressive aphasia   • Right hemiparesis   • Recurrent major depressive disorder     Past Medical History:   Diagnosis Date   • Arthritis    • Back pain    • Breast cancer     left breast, reoccurance times 3   • Depression    • Diabetes mellitus     dx - does not check fsbs   • Disease of thyroid gland    • DM (diabetes mellitus), type 2, uncontrolled 2018   • History of atrial fibrillation    • History of atrial fibrillation without current medication 2018   • History of breast cancer    • Hyperlipidemia    • Hypertension    • Lumbar stenosis with neurogenic claudication 2017    Added automatically from request for surgery [id number]   • Sleep apnea with use of continuous positive airway pressure (CPAP)    • Wears eyeglasses    • Wears partial dentures      Past Surgical History:   Procedure Laterality Date   • APPENDECTOMY     • BREAST LUMPECTOMY Left    • BREAST LUMPECTOMY Left    • BREAST SURGERY Left     mastectomy and reconstruction   • COLONOSCOPY      > 5 years ago   • COLONOSCOPY  ?   • KNEE  CARTILAGE SURGERY Left    • LUMBAR LAMINECTOMY DISCECTOMY DECOMPRESSION N/A 11/24/2017    Procedure: LUMBAR LAMINECTOMY L4-5;  Surgeon: Arnold Del Rio MD;  Location: [facility] OR;  Service:    • MASTECTOMY Left           OT ASSESSMENT FLOWSHEET (last 72 hours)      OT Evaluation       02/14/18 1026 02/14/18 0844 02/13/18 2300 02/13/18 2256       Rehab Evaluation    Document Type evaluation  -AN evaluation  -CD       Subjective Information no complaints;agree to therapy  -AN agree to therapy;complains of;pain;weakness   CHRONIC LBP   -CD       Patient Effort, Rehab Treatment good  -AN excellent  -CD       Symptoms Noted During/After Treatment fatigue  -AN fatigue  -CD       General Information    Patient Profile Review other (see comments)  -AN yes  -CD       Onset of Illness/Injury or Date of Surgery Date 02/13/18  -AN 02/13/18  -CD       Referring Physician  Edil, DO  -AN EDIL, DO  -CD       General Observations Pt reclined in chair, exit alarm  -AN PT SUPINE UPON ARRIVAL ON RA AND WITH IV HEPLOCKED.   -CD       Pertinent History Of Current Problem Pt admitted with R side weakness/numbness, speech difficulties and fall in yard. MRI (-). Pt had back sx Nov2017, with multiple falls since sx  -AN PT TO ED WITH R SIDED WEAKNESS, NUMBNESS, WORD FINDING DIFFICULTY AND A FALL IN HER FRONT YARD. MRI WAS NEG. PT HAD LAMINECTOMY IN NOVEMBER 2017 AND HAS HAD MILD R LE WEAKNESS AND MULTIPLE FALLS SINCE SURGERY. PT WAS GETTING OPPT PTA DUE TO FALLS.   -CD       Precautions/Limitations fall precautions   LE weakness  -AN fall precautions   B LE DF WEAKNESS 2/5. MILD WEAKNESS R HIP/KNEE.   -CD       Prior Level of Function independent:;all household mobility;transfer;ADL's   pt reports cleaning, but not able to do most of it  -AN independent:;all household mobility;community mobility;ADL's;cleaning;using stairs;driving   MULTIPLE FALLS, NOT CLEANING THOROUGHLY DUE TO DIFFICULTY.   -CD       Equipment Currently Used at Home cane,  straight;walker, rolling;rollator;shower chair  -AN cane, straight;walker, rolling   ROLLATOR.   -CD  none  -BH     Plans/Goals Discussed With patient;agreed upon  -AN patient;agreed upon  -CD       Risks Reviewed patient:;LOB;dizziness;increased discomfort;change in vital signs  -AN patient:;LOB;dizziness;increased discomfort;change in vital signs  -CD       Benefits Reviewed patient:;improve function;increase independence;increase balance;increase strength  -AN patient:;improve function;increase independence;increase strength;increase balance;increase knowledge;decrease pain  -CD       Barriers to Rehab medically complex;previous functional deficit  -AN medically complex;previous functional deficit  -CD       Living Environment    Lives With alone  -AN alone  -CD alone  -BC      Living Arrangements house  -AN house  -CD house  -BC      Home Accessibility tub/shower is not walk in;stairs to enter home  -AN tub/shower is not walk in;stairs to enter home  -CD no concerns  -BC      Number of Stairs to Enter Home 3  -AN 3  -CD       Stair Railings at Home outside, present at both sides  -AN outside, present at both sides  -CD none  -BC      Type of Financial/Environmental Concern none  -AN  none  -BC      Transportation Available   car;family or friend will provide  -BC      Living Environment Comment Use of rollator in home, can outdoors with falls. Pt's spouse passed away 7 mths ago and does not have other family around to assist. Difficulty with IADL's and falls.   -AN PT USES ROLLATOR IN HOME AND CANE IN COMMUNITY BUT HAS BEEN FALLING FREQUENTLY. HAS A DOG SHE TAKES OUT ON A LEASH WHICH HAS PULLED HER DOWN RECENTLY. PT REPORTS DIFFICULTY MOPPING AND SWEEPING HER HOME AND HAS TO DO IN A SEATED POSITION FROM A W/C DUE TO RISK OF FALLING. DOES NOT HAVE ANY ASSIST AT HOME OR ANYONE SHE IS COMFORTABLE TO CALL ON. PT'S  PASSED AWAY 7 MONTHS AGO. PT WAS GETTING OPPT PTA TO WORK ON STRENGTHENING AND BALANCE. ALL  INFORMATION WAS RELAYED TO DR ANAYA AND CM.   -CD       Clinical Impression    Date of Referral to OT 02/13/18  -AN        OT Diagnosis Decreased ADL I  -AN        Impairments Found (describe specific impairments) aerobic capacity/endurance;gait, locomotion, and balance  -AN        Patient/Family Goals Statement Agreeable to OT  -AN        Criteria for Skilled Therapeutic Interventions Met yes;treatment indicated  -AN        Rehab Potential good, to achieve stated therapy goals  -AN        Therapy Frequency daily  -AN        Anticipated Equipment Needs At Discharge bathing equipment;tub bench  -AN        Anticipated Discharge Disposition inpatient rehabilitation facility  -AN        Functional Level Prior    Ambulation   1-->assistive equipment  -BC 0-->independent  -BH     Transferring   0-->independent  -BC 0-->independent  -BH     Toileting   0-->independent  -BC 0-->independent  -BH     Bathing   0-->independent  -BC 0-->independent  -BH     Dressing   0-->independent  -BC 0-->independent  -BH     Eating   0-->independent  -BC 0-->independent  -BH     Communication   0-->understands/communicates without difficulty  -BC 0-->understands/communicates without difficulty  -BH     Swallowing   0-->swallows foods/liquids without difficulty  -BC 0-->swallows foods/liquids without difficulty  -BH     Prior Functional Level Comment   baseline  -BC      Vital Signs    Pre Systolic BP Rehab 155  -  -CD       Pre Treatment Diastolic BP 95  -AN 91  -CD       Post Systolic BP Rehab 141  -  -CD       Post Treatment Diastolic BP 88  -AN 92  -CD       Pretreatment Heart Rate (beats/min) 100  -  -CD       Posttreatment Heart Rate (beats/min) 106  -  -CD       Pain Assessment    Pain Assessment 0-10  -AN 0-10  -CD       Pain Score 2  -AN 4  -CD       Post Pain Score 2  -AN 4  -CD       Pain Type Chronic pain  -AN Chronic pain  -CD       Pain Location Back  -AN Back  -CD       Pain Orientation  Lower  -CD        Pain Intervention(s)  Medication (See MAR);Repositioned;Ambulation/increased activity   NSG PREMEDICATED.   -CD       Response to Interventions  TOELRATED.   -CD       Vision Assessment/Intervention    Visual Impairment WFL with corrective lenses  -AN        Vision Comment  DEFERRED TO O.T. PT TRACKING IN ROOM.   -CD       Cognitive Assessment/Intervention    Current Cognitive/Communication Assessment functional  -AN functional  -CD       Orientation Status oriented x 4  -AN oriented x 4  -CD       Follows Commands/Answers Questions 100% of the time  -% of the time;able to follow single-step instructions  -CD       Personal Safety mild impairment;decreased awareness, need for assist;decreased awareness, need for safety;decreased insight to deficits  -AN mild impairment;decreased insight to deficits;decreased awareness, need for safety;decreased awareness, need for assist  -CD       Personal Safety Interventions fall prevention program maintained;gait belt;nonskid shoes/slippers when out of bed;supervised activity  -AN fall prevention program maintained;gait belt;muscle strengthening facilitated;nonskid shoes/slippers when out of bed;supervised activity  -CD       ROM (Range of Motion)    General ROM no range of motion deficits identified  -AN no range of motion deficits identified   PROM FOR FULL ANKLE DF B.   -CD       MMT (Manual Muscle Testing)    General MMT Assessment upper extremity strength deficits identified  -AN lower extremity strength deficits identified   R HIP 3/5, KNEE 4/5. B DF 2/5.   -CD       General MMT Assessment Detail distally elbow to hand R 4-/5, [place] shoulders L UE 4/5  -AN        Bed Mobility, Assessment/Treatment    Bed Mobility, Assistive Device  bed rails;head of bed elevated  -CD       Bed Mob, Supine to Sit, [place]  supervision required  -CD       Bed Mobility, Comment pt up in chair  -AN        Transfer Assessment/Treatment    Transfers, Sit-Stand [place] minimum  assist (75% patient effort);verbal cues required  -AN verbal cues required;minimum assist (75% patient effort)  -CD       Transfers, Stand-Sit [place] verbal cues required;minimum assist (75% patient effort)  -AN contact guard assist;verbal cues required  -CD       Transfers, Sit-Stand-Sit, Assist Device rolling walker  -AN rolling walker  -CD       Toilet Transfer, [place]  minimum assist (75% patient effort)  -CD       Toilet Transfer, Assistive Device  elevated toilet seat;rolling walker   GRAB BAR IN BATHROOM.   -CD       Transfer, Safety Issues loses balance backward;step length decreased;weight-shifting ability decreased  -AN step length decreased;loses balance backward  -CD       Transfer, Impairments strength decreased;impaired balance  -AN strength decreased;impaired balance;sensation decreased  -CD       Transfer, Comment Pt unable to maintain balance standing without assist.  -AN CUES FOR HAND PLACEMENT AND SAFETY WITH R WALKER.   -CD       Lower Body Dressing Assessment/Training    LB Dressing Assess/Train, Comment simulated min for balance, able to don doff socks sitting with I  -AN        Grooming Assessment/Training    Grooming Assess/Train, Comment observed standing with PT, CGA static for washing hands  -AN        Motor Skills/Interventions    Additional Documentation  Balance Skills Training (Group)  -CD       Balance Skills Training    Sitting-Level of Assistance  Independent  -CD       Sitting-Balance Support  Feet supported  -CD       Standing-Level of Assistance Contact guard;Minimum assistance   CG static, min dynamic  -AN Contact guard  -CD       Static Standing Balance Support assistive device  -AN assistive device  -CD       Gait Balance-Level of Assistance  Contact guard  -CD       Gait Balance Support  assistive device  -CD       Sensory Assessment/Intervention    Light Touch LUE;RUE  -AN RLE   IMPAIRED R LE,  BURNING SENSATION DORSAL ASPECT OF B FEET  -CD       LUE Light Touch  WNL  -AN        Positioning and Restraints    Pre-Treatment Position sitting in chair/recliner  -AN in bed  -CD       Post Treatment Position chair  -AN chair   WILL DELIVER B AFO'S TO ROOM FOR NEXT VISIT.   -CD       In Chair reclined;call light within reach;exit alarm on;encouraged to call for assist  -AN reclined;notified nsg;call light within reach;encouraged to call for assist;exit alarm on;legs elevated   DISCUSSED CURRENT MOBILITY STATUS AND REC'S WITH MD/SKYLAR.   -CD         User Key  (r) = Recorded By, (t) = Taken By, (c) = Cosigned By    Initials Name Effective Dates    CD Nasima Resendiz, PT 06/19/15 -     MAURO Veronica OT 06/22/15 -      Josefa Huerta RN 06/16/16 -     CHARLEY Walton RN 08/21/17 -            Occupational Therapy Education     Title: PT OT SLP Therapies (Active)     Topic: Occupational Therapy (Active)     Point: ADL training (Done)    Description: Instruct learner(s) on proper safety adaptation and remediation techniques during self care or transfers.   Instruct in proper use of assistive devices.    Learning Progress Summary    Learner Readiness Method Response Comment Documented by Status   Patient Acceptance AMA HILL NR Educated pt on OT role and discussed POC. Educated on safety with transfers and ADL's. AN 02/14/18 1108 Done   Family Acceptance AMA HILL NR Educated pt on OT role and discussed POC. Educated on safety with transfers and ADL's. AN 02/14/18 1108 Done                      User Key     Initials Effective Dates Name Provider Type Discipline    AN 06/22/15 -  Silvia Veronica OT Occupational Therapist OT                  OT Recommendation and Plan  Anticipated Equipment Needs At Discharge: bathing equipment, tub bench  Anticipated Discharge Disposition: inpatient rehabilitation facility  Therapy Frequency: daily  Plan of Care Review  Plan Of Care Reviewed With: patient  Outcome Summary/Follow up Plan: Pt demonstrates poor standing balance and difficulty with functional  I. Pt will need IRF at discharge as she lives alone and does not have caregiver to assist at this time. Pt prefers home with home health.          OT Goals       02/14/18 1110          Transfer Training OT LTG    Transfer Training OT LTG, Date Established 02/14/18  -AN      Transfer Training OT LTG, Time to Achieve 1 wk  -AN      Transfer Training OT LTG, Activity Type all transfers  -AN      Transfer Training OT LTG, [place] Level contact guard assist  -AN      Transfer Training OT LTG, Assist Device walker, rolling  -AN      Transfer Training OT LTG, Additional Goal luisa DME as needed.  -AN      Dynamic Standing Balance OT LTG    Dynamic Standing Balance OT LTG, Date Established 02/14/18  -AN      Dynamic Standing Balance OT LTG, Time to Achieve 1 wk  -AN      Dynamic Standing Balance OT LTG, [place] Level supervision required  -AN      Dynamic Standing Balance OT LTG, Assist Device UE Support;assistive Device  -AN      Bathing OT LTG    Bathing Goal OT LTG, Date Established 02/14/18  -AN      Bathing Goal OT LTG, Time to Achieve 1 wk  -AN      Bathing Goal OT LTG, Activity Type simulates;upper body bathing;lower body bathing  -AN      Bathing Goal OT LTG, [place] Level supervision required  -AN      Bathing Goal OT LTG, Assist Device shower chair  -AN      LB Dressing OT LTG    LB Dressing Goal OT LTG, Date Established 02/14/18  -AN      LB Dressing Goal OT LTG, Time to Achieve 1 wk  -AN      LB Dressing Goal OT LTG, [place] Level set up required  -AN      LB Dressing Goal OT LTG, Additional Goal with AE as needed  -AN        User Key  (r) = Recorded By, (t) = Taken By, (c) = Cosigned By    Initials Name Provider Type    AN Silvia Veronica OT Occupational Therapist                Outcome Measures       02/14/18 1026 02/14/18 0844       How much help from another person do you currently need...    Turning from your back to your side while in flat bed without using bedrails?  4  -CD     Moving  from lying on back to sitting on the side of a flat bed without bedrails?  4  -CD     Moving to and from a bed to a chair (including a wheelchair)?  3  -CD     Standing up from a chair using your arms (e.g., wheelchair, bedside chair)?  3  -CD     Climbing 3-5 steps with a railing?  2  -CD     To walk in hospital room?  3  -CD     AM-PAC 6 Clicks Score  19  -CD     How much help from another is currently needed...    Putting on and taking off regular lower body clothing? 3  -AN      Bathing (including washing, rinsing, and drying) 2  -AN      Toileting (which includes using toilet bed pan or urinal) 3  -AN      Putting on and taking off regular upper body clothing 3  -AN      Taking care of personal grooming (such as brushing teeth) 4  -AN      Eating meals 4  -AN      Score 19  -AN      Modified [place] Scale    Modified [place] Scale 3 - Moderate disability.  Requiring some help, but able to walk without assistance.  -AN 3 - Moderate disability.  Requiring some help, but able to walk without assistance.   WITH ROLLING WALKER.   -CD     Functional Assessment    Outcome Measure Options AM-PAC 6 Clicks Daily Activity (OT);Modified [place]  -AN AM-PAC 6 Clicks Basic Mobility (PT);Modified [place]  -CD       User Key  (r) = Recorded By, (t) = Taken By, (c) = Cosigned By    Initials Name Provider Type    THAD Resendiz, PT Physical Therapist    MAURO Veronica OT Occupational Therapist          Time Calculation:   OT Start Time: 1026    Therapy Charges for Today     Code Description Service Date Service Provider Modifiers Qty    [id number]  OT EVAL LOW COMPLEXITY 4 2/14/2018 iSlvia Veronica OT GO 1               Silvia Veronica OT  2/14/2018

## 2018-02-14 NOTE — PROGRESS NOTES
[facility] Medicine Services  PROGRESS NOTE    Patient Name: Malissa Child  : 1950  MRN: [id number]    Date of Admission: 2018  Length of Stay: 1  Primary Care Physician: Juan M Nieto MD    Subjective   Subjective     CC: foot weakness, f/u TIA    HPI: Sitting up in bed. Back to her baseline this am. Still c/o foot burning, numbness, weakness.     Review of Systems  Gen- No fevers, chills  CV- No chest pain, palpitations  Resp- No cough, dyspnea  GI- No N/V/D, abd pain    Otherwise ROS is negative except as mentioned in the HPI.    Objective   Objective     Vital Signs:   Temp:  [97.8 °F (36.6 °C)-98.9 °F (37.2 °C)] 98.4 °F (36.9 °C)  Heart Rate:  [103-116] 103  Resp:  [16] 16  BP: (149-189)/() 151/91  Total (NIH Stroke Scale): 0     Physical Exam:  Constitutional: No acute distress, awake, alert, up in bed  HENT: NCAT, mucous membranes moist  Respiratory: Clear to auscultation bilaterally, respiratory effort normal   Cardiovascular: RRR, no murmurs, rubs, or gallops, palpable pedal pulses bilaterally  Gastrointestinal: Positive bowel sounds, soft, nontender, nondistended  Musculoskeletal: No bilateral ankle edema  Psychiatric: Appropriate affect, cooperative  Neurologic: Oriented x 3, CN II-XII intact, 5/5 upper extremity strength, BLE strength 5/5 aside from dorsiflexion of feet which is 4/5  Skin: No rashes    Results Reviewed:  I have personally reviewed current lab, radiology, and data and agree.      Results from last 7 days  Lab Units 18  1702   WBC 10*3/mm3  --  11.96*   HEMOGLOBIN g/dL  --  15.9*   HEMOGLOBIN, POC g/dL  --  16.3   HEMATOCRIT %  --  44.4*   HEMATOCRIT POC %  --  48   PLATELETS 10*3/mm3  --  289   INR  1.02 1.0       Results from last 7 days  Lab Units 18  1140 18  0458 18  2351 18  1702   SODIUM mmol/L  --   --  137  --    POTASSIUM mmol/L  --   --  3.7  --    CHLORIDE mmol/L  --   --  102  --     CO2 mmol/L  --   --  22.0  --    BUN mg/dL  --   --  13  --    CREATININE mg/dL  --   --  0.60 0.50*   GLUCOSE mg/dL  --   --  231*  --    CALCIUM mg/dL  --   --  8.9  --    ALT (SGPT) U/L  --   --  19  --    AST (SGOT) U/L  --   --  20  --    TROPONIN I ng/mL 0.018 0.021 0.015  --      Estimated Creatinine Clearance: 77.4 mL/min (by C-G formula based on Cr of 0.6).  No results found for: BNP  No results found for: PHART    Microbiology Results Abnormal     None        MRI brain personally reviewed without acute disease. Agree with interpretation.  Imaging Results (last 24 hours)     Procedure Component Value Units Date/Time    MRI Brain Without Contrast [[id number]] Collected:  02/13/18 1835     Updated:  02/13/18 2049    Narrative:       EXAM:    MR Head Without Intravenous Contrast    CLINICAL HISTORY:    67 years old, female; Signs and symptoms; Weakness, extremity; Bilateral;   Patient HX: Hypertension. At 1400 the patient suddenly developed word finding   difficulties, the patient reported to ems that she felt weaker on the right   side. At the ed she states that she fell just under a week ago and hit her   right lower chest, hurting her ribcage. She said she did fall a few weeks ago   and did hit head no HX of surgery to head HX of breast cancer; Additional info:   Stroke    TECHNIQUE:    Magnetic resonance images of the head/brain without intravenous contrast in   multiple planes.    COMPARISON:    CT ANGIOGRAM HEAD W WO CONTRAST 2018-02-13 17:01    FINDINGS:    There is no mass or mass effect.      There is no shift of midline structures.      There is ventricular prominence related to parenchymal volume loss.    The ventricular system is otherwise without acute change.      There is no evidence of an acute ischemic or hemorrhagic event.      There are patchy areas of increased T2 signal in the periventricular white   matter.      The gray/white margin is otherwise maintained.      There are no other areas  of abnormal signal.      The sulci are prominent over both cerebral convexities.      There are no abnormal extra-axial fluid collections.      Impression:         Periventricular white matter changes consistent with chronic microvascular   ischemia.    Generalized cerebral atrophy.    Otherwise, no acute changes within the brain.    THIS DOCUMENT HAS BEEN ELECTRONICALLY SIGNED BY HEIDE FRENCH MD    CT Head Without Contrast Stroke Protocol [[id number]] Collected:  02/14/18 0849     Updated:  02/14/18 0914    Narrative:       EXAMINATION: CT HEAD WO CONTRAST STROKE PROTOCOL- 02/13/2018     INDICATION: Stroke         TECHNIQUE: CT scan of the head was performed at 5 mm intervals. No  intravenous contrast was utilized.     The radiation dose reduction device was turned on for each scan per the  ALARA (As Low as Reasonably Achievable) protocol.     COMPARISON: NONE     FINDINGS: There is no intracranial mass. There is no hemorrhage. There  is no midline shift or extra-axial fluid collection.       Impression:       Normal unenhanced CT scan of the head.     Time of examination: 1645.     Time report to ED personnel:  1657.     D:  02/13/2018  E:  02/14/2018        This report was finalized on 2/14/2018 9:11 AM by Dr. Pierre Goss MD.       XR Chest 1 View [[id number]] Collected:  02/14/18 0852     Updated:  02/14/18 0934    Narrative:       EXAMINATION: XR CHEST 1 VW-02/13/2018:      INDICATION: Stroke Protocol (onset > 12 hrs).      COMPARISON: NONE.     FINDINGS:  The lungs are clear. The heart is normal. There is mild  central granulomatous scarring.           Impression:       Negative portable chest for acute abnormality. There is no  aspiration, active disease or edema.     D:  02/13/2018  E:  02/14/2018     This report was finalized on 2/14/2018 9:32 AM by Dr. Chan Clemens MD.       CT Cerebral Perfusion With & Without Contrast [[id number]] Collected:  02/14/18 0852     Updated:  02/14/18 0935    Narrative:        EXAMINATION: CT CEREBRAL PERFUSION W WO CONTRAST- 02/13/2018     INDICATION: TIA, initial screening, right sided weakness, difficulty  speaking, aphasia, Code 19, change in neurologic status     TECHNIQUE: Cerebral perfusion analysis was performed using computed  tomography with contrast administration including post processing of  parametric maps with determination of cerebral blood flow, cerebral  blood volume, mean transit time and time to drain.     The radiation dose reduction device was turned on for each scan per the  ALARA (As Low as Reasonably Achievable) protocol.     COMPARISON: Compared to previous CT datasets of the brain performed  immediately prior to this study.     FINDINGS:   1. There is no evidence of evolving core infarct on the cerebral blood  flow datasets.  2. There is no discordance between cerebral blood flow and cerebral  blood volume parametric maps and no evidence of salvageable ischemic as  noted.  3. There is slow time to drain diffusely in the brain which may reflect  the clinical status but this is nonfocal.     There is no evidence of a delay in time to drain or in the main transit  time that is within a major vascular territory.       Impression:       1. Negative CT cerebral perfusion dataset for evolving core infarct,  salvageable ischemic, or acute ischemic insult within the major vascular  territory.  2. Note is made of diffuse slow flow with slow time to drain and slow  mean transit time throughout all areas of the brain without affecting a  dominant major vascular territory.     D:  02/13/2018  E:  02/14/2018     This report was finalized on 2/14/2018 9:33 AM by Dr. Chan Clemens MD.       CT Angiogram Neck With & Without Contrast [[id number]] Collected:  02/14/18 0832     Updated:  02/14/18 0935    Narrative:       EXAMINATION: CT ANGIOGRAM HEAD W WO CONTRAST-, CT ANGIOGRAM NECK W WO  CONTRAST- 02/13/2018     INDICATION: Stroke , follow-up code 19, change in neurologic  status,  right-sided weakness     TECHNIQUE:      CTA datasets were performed on the neck and head with intravenous  contrast enhancement.     2-D reconstructed reformatted images were formulated at the workstation  in the coronal and sagittal planes.     The radiation dose reduction device was turned on for each scan per the  ALARA (As Low as Reasonably Achievable) protocol.     COMPARISON: NONE     FINDINGS:   1. Transverse aortic arch is intact without dissection or plaque. Origin  of the great vessels is intact from the transverse arch.     Common carotid arteries are bilaterally normal patent and smooth. The  carotid bifurcations are normal patent and smooth. The internal carotid  arteries are normal patent and smooth.     2. The petrous and cavernous carotid arteries are normal patent and  smooth. Internal carotid terminus is bilaterally normal.  3. The intracranial vascularity is within normal limits diffusely and  bilaterally. There is no evidence of major branch occlusion, dissection,  high-grade stenotic plaque or significant loss of flow.  4. Vertebral arteries are patent in the neck. The right vertebral is  slightly nondominant, however, both vertebral arteries are patent to the  base of the basilar artery. The vertebral basilar confluence, basilar  artery basilar summit and posterior cerebral circulation is within  normal limits.  5. Reconstructed 2-D datasets in the coronal sagittal and transaxial  planes are normal.       Impression:       1. Normal CTA datasets of the neck to include carotid and vertebral  circulation.  2. Normal CTA datasets of the brain.  3. No evidence of detectable vascular disease or acute vascular  abnormality is identified. There is no plaque, no dissection, no major  branch occlusion and no detectable ulcerated plaque disease.     D:  02/13/2018  E:  02/14/2018        This report was finalized on 2/14/2018 9:33 AM by Dr. Chan Clemens MD.       CT Angiogram Head With &  Without Contrast [[id number]] Collected:  02/14/18 0832     Updated:  02/14/18 0935    Narrative:       EXAMINATION: CT ANGIOGRAM HEAD W WO CONTRAST-, CT ANGIOGRAM NECK W WO  CONTRAST- 02/13/2018     INDICATION: Stroke , follow-up code 19, change in neurologic status,  right-sided weakness     TECHNIQUE:      CTA datasets were performed on the neck and head with intravenous  contrast enhancement.     2-D reconstructed reformatted images were formulated at the workstation  in the coronal and sagittal planes.     The radiation dose reduction device was turned on for each scan per the  ALARA (As Low as Reasonably Achievable) protocol.     COMPARISON: NONE     FINDINGS:   1. Transverse aortic arch is intact without dissection or plaque. Origin  of the great vessels is intact from the transverse arch.     Common carotid arteries are bilaterally normal patent and smooth. The  carotid bifurcations are normal patent and smooth. The internal carotid  arteries are normal patent and smooth.     2. The petrous and cavernous carotid arteries are normal patent and  smooth. Internal carotid terminus is bilaterally normal.  3. The intracranial vascularity is within normal limits diffusely and  bilaterally. There is no evidence of major branch occlusion, dissection,  high-grade stenotic plaque or significant loss of flow.  4. Vertebral arteries are patent in the neck. The right vertebral is  slightly nondominant, however, both vertebral arteries are patent to the  base of the basilar artery. The vertebral basilar confluence, basilar  artery basilar summit and posterior cerebral circulation is within  normal limits.  5. Reconstructed 2-D datasets in the coronal sagittal and transaxial  planes are normal.       Impression:       1. Normal CTA datasets of the neck to include carotid and vertebral  circulation.  2. Normal CTA datasets of the brain.  3. No evidence of detectable vascular disease or acute vascular  abnormality is  identified. There is no plaque, no dissection, no major  branch occlusion and no detectable ulcerated plaque disease.     D:  02/13/2018  E:  02/14/2018        This report was finalized on 2/14/2018 9:33 AM by Dr. Chan Clemens MD.                I have reviewed the medications.    Assessment/Plan   Assessment / Plan     Hospital Problem List     * (Principal)Transient cerebral ischemia    Lumbar stenosis with neurogenic claudication    Overview Signed 11/1/2017  9:31 AM by Arnold Del Rio MD     Added automatically from request for surgery [id number]         ADELE on CPAP    Polycythemia    DM (diabetes mellitus), type 2, uncontrolled    Diabetic peripheral neuropathy associated with type 2 diabetes mellitus    History of atrial fibrillation without current medication    Hyperlipidemia LDL goal <70    Essential hypertension    Personal history of breast cancer    Expressive aphasia    Right hemiparesis    Recurrent major depressive disorder             Brief Hospital Course to date:  Malissa Child is a 67 y.o. female with HTN and DM2 presenting with TIA. Also has longstanding peripheral neuropathy which has greatly limited her mobility.    Assessment & Plan:  --Patient with 2 distinct problems. Her TIA symptoms have resolved and Neuro has seen her for this. Will continue asa, statin, bp and DM2 mgmt. Carotid imaging and echo unremarkable. Needs optimization of her chronic medical problems which was d/w her at length this am and she is agreeable to this. Has been neglecting herself due to her spouse's recent death.  --Her other main limiting problem is severe peripheral neuropathy which may be in part due to DM2 as well as prior radicular disease. PT/OT has seen and recommends rehab which she is now agreeable to.  --Increase insulin.  --Increase lisinopril. Add other agents as needed.  --Labs in am.    DVT Prophylaxis: Lovenox    CODE STATUS: Full Code    Disposition: I expect the patient to be discharged to  inpatient rehab once bed available.      Electronically signed by Cris Melvin II, DO, 02/14/18, 1:33 PM.

## 2018-02-14 NOTE — PLAN OF CARE
Problem: Patient Care Overview (Adult)  Goal: Plan of Care Review  Outcome: Ongoing (interventions implemented as appropriate)   02/14/18 1110   Coping/Psychosocial Response Interventions   Plan Of Care Reviewed With patient   Outcome Evaluation   Outcome Summary/Follow up Plan Pt demonstrates poor standing balance and difficulty with functional I. Pt will need IRF at discharge as she lives alone and does not have caregiver to assist at this time. Pt prefers home with home health.       Problem: Inpatient Occupational Therapy  Goal: Transfer Training Goal 1 LTG- OT  Outcome: Ongoing (interventions implemented as appropriate)   02/14/18 1110   Transfer Training OT LTG   Transfer Training OT LTG, Date Established 02/14/18   Transfer Training OT LTG, Time to Achieve 1 wk   Transfer Training OT LTG, Activity Type all transfers   Transfer Training OT LTG, [place] Level contact guard assist   Transfer Training OT LTG, Assist Device walker, rolling   Transfer Training OT LTG, Additional Goal luisa DME as needed.     Goal: Dynamic Standing Balance Goal LTG-OT  Outcome: Ongoing (interventions implemented as appropriate)   02/14/18 1110   Dynamic Standing Balance OT LTG   Dynamic Standing Balance OT LTG, Date Established 02/14/18   Dynamic Standing Balance OT LTG, Time to Achieve 1 wk   Dynamic Standing Balance OT LTG, [place] Level supervision required   Dynamic Standing Balance OT LTG, Assist Device UE Support;assistive Device     Goal: Bathing Goal LTG- OT  Outcome: Ongoing (interventions implemented as appropriate)   02/14/18 1110   Bathing OT LTG   Bathing Goal OT LTG, Date Established 02/14/18   Bathing Goal OT LTG, Time to Achieve 1 wk   Bathing Goal OT LTG, Activity Type simulates;upper body bathing;lower body bathing   Bathing Goal OT LTG, [place] Level supervision required   Bathing Goal OT LTG, Assist Device shower chair     Goal: LB Dressing Goal LTG- OT  Outcome: Ongoing (interventions implemented  as appropriate)   02/14/18 1110   LB Dressing OT LTG   LB Dressing Goal OT LTG, Date Established 02/14/18   LB Dressing Goal OT LTG, Time to Achieve 1 wk   LB Dressing Goal OT LTG, [place] Level set up required   LB Dressing Goal OT LTG, Additional Goal with AE as needed

## 2018-02-14 NOTE — CONSULTS
Referring Provider: Dr. Melvin  Reason for Consultation: Transient right-sided weakness and speech difficulty    Patient Care Team:  Juan M Nieto MD as PCP - General  Juan M Nieto MD as Referring Physician (Family Medicine)  Eric Neves MD as Consulting Physician (Hematology and Oncology)    Chief complaint transient right-sided weakness and speech difficulty    Subjective .     History of present illness:  67-year-old left-handed woman with diabetes, hypertension, hyperlipidemia who does not test her blood sugars at home, was brought to [facility] yesterday after falling in her front yard.  She has been doing physical therapy as outpatient for weakness in her legs and has had multiple falls.  She went to get her mail and noted weakness first of her right hand and dropped her mail and then fell.  She was unable to  the mail or use her right hand normally and also noted great deal of difficulty getting words out although she knew what she wanted to say.  She has some chronic weakness and numbness of her distal right lower extremity but noted more extensive numbness and weakness of the right leg but no face symptoms.  She also noted diminished vision in the right eye (as opposed to right visual field).  These symptoms lasted about an hour and had improved significantly but not fully resolved by the time she reached the emergency department.  She denies previous similar symptoms or transient or permanent unilateral weakness numbness or speech difficulty.  She does have a history of atrial fibrillation in the past, she believes at least a year if not longer ago.  She reports she does not take aspirin daily nor any anticoagulation.  She does not follow with cardiologist.  She does have chronic right lower extremity weakness since laminectomy in November and has a history of lumbar stenosis with neurogenic claudication.    Review of Systems  Pertinent items are noted in HPI, all other  systems reviewed and negative, except as noted in Dr. Lacy's H&P of 2/13/18 and reviewed with the patient    History  Past Medical History:   Diagnosis Date   • Arthritis    • Back pain    • Breast cancer     left breast, reoccurance times 3   • Depression    • Diabetes mellitus     dx 2000- does not check fsbs   • Disease of thyroid gland    • DM (diabetes mellitus), type 2, uncontrolled 2/13/2018   • History of atrial fibrillation    • History of atrial fibrillation without current medication 2/13/2018   • History of breast cancer    • Hyperlipidemia    • Hypertension    • Lumbar stenosis with neurogenic claudication 11/1/2017    Added automatically from request for surgery [id number]   • Sleep apnea with use of continuous positive airway pressure (CPAP)    • Wears eyeglasses    • Wears partial dentures    ,   Past Surgical History:   Procedure Laterality Date   • APPENDECTOMY  2012   • BREAST LUMPECTOMY Left 1995   • BREAST LUMPECTOMY Left 2012   • BREAST SURGERY Left 2014    mastectomy and reconstruction   • COLONOSCOPY      > 5 years ago   • COLONOSCOPY  2010?   • KNEE CARTILAGE SURGERY Left    • LUMBAR LAMINECTOMY DISCECTOMY DECOMPRESSION N/A 11/24/2017    Procedure: LUMBAR LAMINECTOMY L4-5;  Surgeon: Arnold Del Rio MD;  Location: [facility];  Service:    • MASTECTOMY Left    ,   Family History   Problem Relation Age of Onset   • Breast cancer Mother    • Cancer Father    • Cancer Brother    • Pancreatitis Brother    • Cancer Maternal Grandmother    • Cancer Maternal Grandfather    • Dementia Paternal Grandmother    • Cancer Paternal Grandfather    • Lymphoma Brother    ,   Social History   Substance Use Topics   • Smoking status: Never Smoker   • Smokeless tobacco: Never Used   • Alcohol use Yes      Comment: occasion, less than once a month   ,   Prescriptions Prior to Admission   Medication Sig Dispense Refill Last Dose   • amitriptyline (ELAVIL) 25 MG tablet Take 25 mg by mouth Every Night.   Taking   •  "exemestane (AROMASIN) 25 MG chemo tablet Take 25 mg by mouth Daily.   Taking   • FLUoxetine (PROzac) 10 MG capsule Take 10 mg by mouth Daily.   Taking   • glimepiride (AMARYL) 4 MG tablet Take 4 mg by mouth Every Morning Before Breakfast.   Taking   • HYDROcodone-acetaminophen (NORCO) 5-325 MG per tablet    Patient Taking Differently at Unknown time   • JARDIANCE 25 MG tablet Take 25 mg by mouth Daily.   Taking   • levothyroxine (SYNTHROID) 88 MCG tablet Take 88 mcg by mouth Daily.   Taking   • lisinopril (PRINIVIL,ZESTRIL) 20 MG tablet Take 20 mg by mouth Daily.   Taking   • metFORMIN (GLUCOPHAGE) 1000 MG tablet Take 1,000 mg by mouth 2 (Two) Times a Day With Meals.   Taking   • naproxen sodium (ALEVE) 220 MG tablet Take 220 mg by mouth 2 (Two) Times a Day As Needed for Mild Pain .   Taking   • pravastatin (PRAVACHOL) 40 MG tablet Take 40 mg by mouth Daily.   Taking   , Scheduled Meds:    aspirin 325 mg Oral Daily   Or      aspirin 300 mg Rectal Daily   atorvastatin 80 mg Oral Nightly   docusate sodium 100 mg Oral BID   famotidine 40 mg Oral Nightly   FLUoxetine 20 mg Oral Daily   insulin detemir 8 Units Subcutaneous Nightly   insulin lispro 0-7 Units Subcutaneous 4x Daily With Meals & Nightly   levothyroxine 88 mcg Oral Daily   lisinopril 20 mg Oral Q24H   , Continuous Infusions:   , PRN Meds:  dextrose  •  dextrose  •  glucagon (human recombinant)  •  HYDROcodone-acetaminophen  •  labetalol  •  pneumococcal polysaccharide 23-valent  •  sodium chloride  •  sodium chloride  •  sodium chloride and Allergies:  Cat hair extract; Dust mite extract; and Mold extract [trichophyton]    Objective     Vital Signs   Blood pressure 151/91, pulse 103, temperature 98.4 °F (36.9 °C), resp. rate 16, height 167.6 cm (66\"), weight 69.9 kg (154 lb), SpO2 95 %, not currently breastfeeding.    Physical Exam:   Well-developed middle-aged white woman sitting comfortably in the hospital bed, alert and fully oriented, with normal " language, attention, memory and fund of knowledge.  She currently exhibits no word finding difficulty or other basic signs.  Pupils are 3 mm and reactive to light, visual fields full to confrontation, extraocular movements intact, normal facial sensation and movement, hearing intact to voice, palate and shoulders elevate symmetrically, tongue protrudes midline  Motor: Upper extremities 5/5 no pronator drift, lower extremities with no detectable proximal weakness on manual muscle testing, but bilateral weakness of foot dorsiflexion at 4 minus/5  Coordination is commensurate with strength, tone is normal  She has diminished vibration sense to the knees bilaterally but no asymmetry of sensation  Gait was not tested  Neck is supple, no carotid bruits appreciated  Heart currently tachycardic but no murmur appreciated  Abdomen soft, NT, ND with positive bowel sounds    Results Review:   I reviewed the patient's new clinical results.  I reviewed the patient's new imaging results and agree with the interpretation.  I reviewed the patient's other test results and agree with the interpretation   Brain MRI, CTA of head and neck all personally reviewed and agree with interpretation.  She has chronic small vessel disease but no acute ischemia evident on MRI or perfusion scan, and no large vessel stenosis.  A1c is 13.3  Triglycerides elevated, normal platelets      Assessment/Plan     Principal Problem:    Transient cerebral ischemia  Active Problems:    Lumbar stenosis with neurogenic claudication    ADELE on CPAP    Polycythemia    DM (diabetes mellitus), type 2, uncontrolled    Diabetic peripheral neuropathy associated with type 2 diabetes mellitus    History of atrial fibrillation without current medication    Hyperlipidemia LDL goal <70    Essential hypertension    Personal history of breast cancer    Expressive aphasia    Right hemiparesis    Recurrent major depressive disorder      TIA-hemiparesis with speech/language  difficulty suggests possible large vessel MCA territory involvement although this would not be expected to cause monocular vision loss.  Her history of atrial fibrillation is troubling, particularly as she would not be an anticoagulation candidate with very frequent falls recently.  PT has recommended inpatient therapy with which I would concur, but she is uncertain.  Await echo.  Agree with aspirin, high-dose statin and patient states she is aware that she will need to start monitoring her blood sugars.  We discussed risk factors for stroke and potential for permanent disability.    I discussed the patients findings and my recommendations with patient    Estefanía Villeda MD  02/14/18  10:54 AM

## 2018-02-14 NOTE — PLAN OF CARE
Problem: Patient Care Overview (Adult)  Goal: Plan of Care Review  Outcome: Ongoing (interventions implemented as appropriate)   02/14/18 1500   Coping/Psychosocial Response Interventions   Plan Of Care Reviewed With patient   Patient Care Overview   Progress improving  (initial Cog/comm eval)   Outcome Evaluation   Outcome Summary/Follow up Plan Cog/comm eval complete. Per stroke protocol. Mild short term memory deficit identified that is at baseline per pt, otherwise cognitive-linguistic skills were functional. Word-finding deficit that was previously reported by pt has resolved. Expressive and receptive language, reading, and writing were all found to be WFL. No dysarthria or apraxia. Speech was 100% intelligible in conversation. Pt passed RN dysphagia screen and reports no swallowing concerns. RECS: No further ST needs at this time.

## 2018-02-14 NOTE — PROGRESS NOTES
Discharge Planning Assessment  [facility]     Patient Name: Malissa Child  MRN: [id number]  Today's Date: 2/14/2018    Admit Date: 2/13/2018          Discharge Needs Assessment       02/14/18 1233    Discharge Needs Assessment    Discharge Contact Information if Applicable Humaira Ceron ( friend) [phone]      02/14/18 1212    Living Environment    Lives With alone    Living Arrangements house    Home Accessibility tub/shower is not walk in;stairs to enter home    Number of Stairs to Enter Home 3    Stair Railings at Home outside, present at both sides    Type of Financial/Environmental Concern none    Transportation Available car;family or friend will provide    Living Environment    Provides Primary Care For no one, unable/limited ability to care for self    Quality Of Family Relationships --   has neighbors and friends that provide some assistance    Able to Return to Prior Living Arrangements --   to be determined    Discharge Needs Assessment    Concerns To Be Addressed basic needs concerns;discharge planning concerns    Readmission Within The Last 30 Days no previous admission in last 30 days    Outpatient/Agency/Support Group Needs --   goes to outpt PT at [facility] in [place]    Anticipated Changes Related to Illness inability to care for self    Equipment Currently Used at Home cane, straight;walker, rolling;rollator;shower chair    Equipment Needed After Discharge none    Discharge Disposition --   to be determined            Discharge Plan       02/14/18 1234    Case Management/Social Work Plan    Plan to be determined    Patient/Family In Agreement With Plan yes    Additional Comments I met with Ms Child to discuss initiate d/c plaanning. She lives alone,recently lost her  ( 7 mos ago). She has no help at home other than a neighbor and some friends. She has equipment( BSC, rolling walker,rollator)., She is independent with her own ADL's, drives, does her own grocery shopping., but admits to  falling frequently.  PT/OT acacia noted. They have recommended inpt rehab. She would prefer to go hme. She has been going to outpt PT at [facility] in [place]. I spoke with Jennyfer from [facility], she confirmed, their PT would be able to make home visits until she is ready to transition back to outpt. We discussed possibility of  inpt rehab at [facility], she is agreeable to a referral. I spoke to Anabelle who accepted referral. Case mgt will follow and assist with final plan.        Discharge Placement     No information found        Expected Discharge Date and Time     Expected Discharge Date Expected Discharge Time    Feb 16, 2018               Demographic Summary       02/14/18 1208    Referral Information    Arrived From home or self-care    Referral Source physician    Reason For Consult discharge planning    Record Reviewed history and physical;medical record    Contact Information    Permission Granted to Share Information With     Primary Care Physician Information    Name Master Nieto ( [place])            Functional Status       02/14/18 1210    Functional Status Prior    Ambulation 1-->assistive equipment    Transferring 1-->assistive equipment    Toileting 0-->independent    Bathing 0-->independent    Dressing 0-->independent    Eating 0-->independent    Communication 0-->understands/communicates without difficulty    Swallowing 0-->swallows foods/liquids without difficulty    IADL    Medications independent    Meal Preparation independent    Housekeeping independent    Laundry independent    Shopping independent    Oral Care independent    Activity Tolerance    Current Activity Limitations spinal precautions    Usual Activity Tolerance fair    Cognitive/Perceptual/Developmental    Current Mental Status/Cognitive Functioning no deficits noted    Recent Changes in Mental Status/Cognitive Functioning no changes    Employment/Financial    Current Occupation (Previous Occupation if Retired) --   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

## 2018-02-14 NOTE — PROGRESS NOTES
Continued Stay Note  [facility]     Patient Name: Malissa Child  MRN: [id number]  Today's Date: 2/14/2018    Admit Date: 2/13/2018          Discharge Plan       02/14/18 1602    Case Management/Social Work Plan    Plan home with home PT    Patient/Family In Agreement With Plan yes    Additional Comments See previous Case mgt note. Per Nancy at [facility], they can accept her into an acute rehab bed tomorrow 2/15, but Ms Child has declined. She stated she did not want to go, because she had no one to take care of her dog. She would prefer home PT through [facility]. Case mgt will notify [facility] in am of d/c. Case mgt will f/u in am              Discharge Codes     None        Expected Discharge Date and Time     Expected Discharge Date Expected Discharge Time    Feb 16, 2018             Sonja C Kellerman, RN

## 2018-02-14 NOTE — PLAN OF CARE
Problem: Patient Care Overview (Adult)  Goal: Plan of Care Review  Outcome: Ongoing (interventions implemented as appropriate)      Problem: Fall Risk (Adult)  Goal: Identify Related Risk Factors and Signs and Symptoms  Outcome: Ongoing (interventions implemented as appropriate)    Goal: Absence of Falls  Outcome: Ongoing (interventions implemented as appropriate)

## 2018-02-14 NOTE — PLAN OF CARE
Problem: Patient Care Overview (Adult)  Goal: Plan of Care Review  Outcome: Ongoing (interventions implemented as appropriate)   02/14/18 1028   Coping/Psychosocial Response Interventions   Plan Of Care Reviewed With patient   Outcome Evaluation   Outcome Summary/Follow up Plan PT PRESENTS WITH EVOLVING SYMPTOMS TO INCLUDE MILD R LE WEAKNESS HIP/KNEE, SIGNIFICANT WEAKNESS B DF, IMPAIRED BALANCE, IMPAIRED SENSATION,  UNSTEADY GAIT AND DECLINE IN FUNCTIONAL MOBILITY. PT REPORTS MULTIPLE FALLS RECENTLY . RECOMMEND TRIAL OF GAIT WITH B AFO'S AND USE OF REG ROLLING WX VS ROLLATOR OR CANE. PT WOULD BENEFIT FROM IRF STAY AS SHE LIVES ALONE BUT CURRENTLY PREFERS THE IDEA OF GOING HOME WITH HHPT WITH TRANSITION BACK TO OPPT.        Problem: Inpatient Physical Therapy  Goal: Transfer Training Goal 1 LTG- PT  Outcome: Ongoing (interventions implemented as appropriate)   02/14/18 1028   Transfer Training PT LTG   Transfer Training PT LTG, Time to Achieve 2 wks   Transfer Training PT LTG, Activity Type all transfers   Transfer Training PT LTG, [place] Level independent   Transfer Training PT LTG, Assist Device walker, rolling     Goal: Gait Training Goal LTG- PT  Outcome: Ongoing (interventions implemented as appropriate)   02/14/18 1028   Gait Training PT LTG   Gait Training Goal PT LTG, Time to Achieve 2 wks   Gait Training Goal PT LTG, [place] Level independent   Gait Training Goal PT LTG, Assist Device walker, rolling   Gait Training Goal PT LTG, Distance to Achieve 600   Gait Training Goal PT LTG, Additional Goal WITH B AFO'S IF TOLERATED.      Goal: Stair Training Goal LTG- PT  Outcome: Ongoing (interventions implemented as appropriate)

## 2018-02-15 ENCOUNTER — APPOINTMENT (OUTPATIENT)
Dept: CARDIOLOGY | Facility: HOSPITAL | Age: 68
End: 2018-02-15
Attending: INTERNAL MEDICINE

## 2018-02-15 VITALS
DIASTOLIC BLOOD PRESSURE: 82 MMHG | BODY MASS INDEX: 24.75 KG/M2 | SYSTOLIC BLOOD PRESSURE: 157 MMHG | RESPIRATION RATE: 16 BRPM | OXYGEN SATURATION: 96 % | HEART RATE: 114 BPM | WEIGHT: 154 LBS | HEIGHT: 66 IN | TEMPERATURE: 98.3 F

## 2018-02-15 LAB
ANION GAP SERPL CALCULATED.3IONS-SCNC: 5 MMOL/L (ref 3–11)
BH CV ECHO MEAS - AO ROOT AREA (BSA CORRECTED): 1.5
BH CV ECHO MEAS - AO ROOT AREA: 5.9 CM^2
BH CV ECHO MEAS - AO ROOT DIAM: 2.7 CM
BH CV ECHO MEAS - BSA(HAYCOCK): 1.8 M^2
BH CV ECHO MEAS - BSA: 1.8 M^2
BH CV ECHO MEAS - BZI_BMI: 24.9 KILOGRAMS/M^2
BH CV ECHO MEAS - BZI_METRIC_HEIGHT: 167.6 CM
BH CV ECHO MEAS - BZI_METRIC_WEIGHT: 69.9 KG
BH CV ECHO MEAS - CONTRAST EF (2CH): 67.3 ML/M^2
BH CV ECHO MEAS - CONTRAST EF 4CH: 64.7 ML/M^2
BH CV ECHO MEAS - EDV(CUBED): 15.5 ML
BH CV ECHO MEAS - EDV(MOD-SP2): 52 ML
BH CV ECHO MEAS - EDV(MOD-SP4): 34 ML
BH CV ECHO MEAS - EDV(TEICH): 22.2 ML
BH CV ECHO MEAS - EF(CUBED): 65.9 %
BH CV ECHO MEAS - EF(MOD-SP2): 67.3 %
BH CV ECHO MEAS - EF(MOD-SP4): 64 %
BH CV ECHO MEAS - EF(TEICH): 59.7 %
BH CV ECHO MEAS - ESV(CUBED): 5.3 ML
BH CV ECHO MEAS - ESV(MOD-SP2): 17 ML
BH CV ECHO MEAS - ESV(MOD-SP4): 12 ML
BH CV ECHO MEAS - ESV(TEICH): 8.9 ML
BH CV ECHO MEAS - FS: 30.1 %
BH CV ECHO MEAS - IVS/LVPW: 0.95
BH CV ECHO MEAS - IVSD: 1.2 CM
BH CV ECHO MEAS - LA DIMENSION: 2.8 CM
BH CV ECHO MEAS - LA/AO: 1
BH CV ECHO MEAS - LAT PEAK E' VEL: 7.2 CM/SEC
BH CV ECHO MEAS - LV DIASTOLIC VOL/BSA (35-75): 19 ML/M^2
BH CV ECHO MEAS - LV MASS(C)D: 86.9 GRAMS
BH CV ECHO MEAS - LV MASS(C)DI: 48.5 GRAMS/M^2
BH CV ECHO MEAS - LV SYSTOLIC VOL/BSA (12-30): 6.7 ML/M^2
BH CV ECHO MEAS - LVIDD: 2.5 CM
BH CV ECHO MEAS - LVIDS: 1.7 CM
BH CV ECHO MEAS - LVLD AP2: 6.5 CM
BH CV ECHO MEAS - LVLD AP4: 5.9 CM
BH CV ECHO MEAS - LVLS AP2: 6 CM
BH CV ECHO MEAS - LVLS AP4: 5.2 CM
BH CV ECHO MEAS - LVPWD: 1.2 CM
BH CV ECHO MEAS - MED PEAK E' VEL: 4.8 CM/SEC
BH CV ECHO MEAS - PA ACC SLOPE: 736.4 CM/SEC^2
BH CV ECHO MEAS - PA ACC TIME: 0.13 SEC
BH CV ECHO MEAS - PA PR(ACCEL): 19.6 MMHG
BH CV ECHO MEAS - RVDD: 2 CM
BH CV ECHO MEAS - SI(CUBED): 5.7 ML/M^2
BH CV ECHO MEAS - SI(MOD-SP2): 19.6 ML/M^2
BH CV ECHO MEAS - SI(MOD-SP4): 12.3 ML/M^2
BH CV ECHO MEAS - SI(TEICH): 7.4 ML/M^2
BH CV ECHO MEAS - SV(CUBED): 10.2 ML
BH CV ECHO MEAS - SV(MOD-SP2): 35 ML
BH CV ECHO MEAS - SV(MOD-SP4): 22 ML
BH CV ECHO MEAS - SV(TEICH): 13.2 ML
BH CV ECHO MEAS - TAPSE (>1.6): 1.8 CM2
BH CV VAS BP RIGHT ARM: NORMAL MMHG
BH CV XLRA - RV BASE: 2.4 CM
BH CV XLRA - RV LENGTH: 5.2 CM
BH CV XLRA - RV MID: 2.2 CM
BH CV XLRA - TDI S': 14.9 CM/SEC
BUN BLD-MCNC: 16 MG/DL (ref 9–23)
BUN/CREAT SERPL: 26.7 (ref 7–25)
CALCIUM SPEC-SCNC: 9.1 MG/DL (ref 8.7–10.4)
CHLORIDE SERPL-SCNC: 100 MMOL/L (ref 99–109)
CO2 SERPL-SCNC: 30 MMOL/L (ref 20–31)
CREAT BLD-MCNC: 0.6 MG/DL (ref 0.6–1.3)
DEPRECATED RDW RBC AUTO: 40.9 FL (ref 37–54)
E/E' RATIO: 6
ERYTHROCYTE [DISTWIDTH] IN BLOOD BY AUTOMATED COUNT: 13.3 % (ref 11.3–14.5)
GFR SERPL CREATININE-BSD FRML MDRD: 100 ML/MIN/1.73
GLUCOSE BLD-MCNC: 193 MG/DL (ref 70–100)
GLUCOSE BLDC GLUCOMTR-MCNC: 261 MG/DL (ref 70–130)
GLUCOSE BLDC GLUCOMTR-MCNC: 328 MG/DL (ref 70–130)
HCT VFR BLD AUTO: 44.2 % (ref 34.5–44)
HGB BLD-MCNC: 15.3 G/DL (ref 11.5–15.5)
LEFT ATRIUM VOLUME INDEX: 22.3 ML/M2
MAXIMAL PREDICTED HEART RATE: 155 BPM
MCH RBC QN AUTO: 29.3 PG (ref 27–31)
MCHC RBC AUTO-ENTMCNC: 34.6 G/DL (ref 32–36)
MCV RBC AUTO: 84.7 FL (ref 80–99)
PLATELET # BLD AUTO: 276 10*3/MM3 (ref 150–450)
PMV BLD AUTO: 10.5 FL (ref 6–12)
POTASSIUM BLD-SCNC: 4 MMOL/L (ref 3.5–5.5)
RBC # BLD AUTO: 5.22 10*6/MM3 (ref 3.89–5.14)
SODIUM BLD-SCNC: 135 MMOL/L (ref 132–146)
STRESS TARGET HR: 132 BPM
WBC NRBC COR # BLD: 9.65 10*3/MM3 (ref 3.5–10.8)

## 2018-02-15 PROCEDURE — 99239 HOSP IP/OBS DSCHRG MGMT >30: CPT | Performed by: INTERNAL MEDICINE

## 2018-02-15 PROCEDURE — 97116 GAIT TRAINING THERAPY: CPT

## 2018-02-15 PROCEDURE — 93306 TTE W/DOPPLER COMPLETE: CPT | Performed by: INTERNAL MEDICINE

## 2018-02-15 PROCEDURE — 85027 COMPLETE CBC AUTOMATED: CPT | Performed by: INTERNAL MEDICINE

## 2018-02-15 PROCEDURE — 25010000002 ENOXAPARIN PER 10 MG: Performed by: INTERNAL MEDICINE

## 2018-02-15 PROCEDURE — 80048 BASIC METABOLIC PNL TOTAL CA: CPT | Performed by: INTERNAL MEDICINE

## 2018-02-15 PROCEDURE — 93306 TTE W/DOPPLER COMPLETE: CPT

## 2018-02-15 PROCEDURE — 82962 GLUCOSE BLOOD TEST: CPT

## 2018-02-15 PROCEDURE — 97110 THERAPEUTIC EXERCISES: CPT

## 2018-02-15 RX ORDER — LISINOPRIL 40 MG/1
40 TABLET ORAL
Qty: 30 TABLET | Refills: 0 | Status: SHIPPED | OUTPATIENT
Start: 2018-02-16

## 2018-02-15 RX ORDER — ATORVASTATIN CALCIUM 80 MG/1
80 TABLET, FILM COATED ORAL NIGHTLY
Qty: 30 TABLET | Refills: 0 | Status: SHIPPED | OUTPATIENT
Start: 2018-02-15

## 2018-02-15 RX ORDER — ASPIRIN 325 MG
325 TABLET ORAL DAILY
Qty: 30 TABLET | Refills: 0 | Status: SHIPPED | OUTPATIENT
Start: 2018-02-16

## 2018-02-15 RX ADMIN — ASPIRIN 325 MG ORAL TABLET 325 MG: 325 PILL ORAL at 08:20

## 2018-02-15 RX ADMIN — LISINOPRIL 40 MG: 40 TABLET ORAL at 08:20

## 2018-02-15 RX ADMIN — ENOXAPARIN SODIUM 40 MG: 40 INJECTION SUBCUTANEOUS at 14:55

## 2018-02-15 RX ADMIN — FLUOXETINE HYDROCHLORIDE 20 MG: 20 CAPSULE ORAL at 08:20

## 2018-02-15 RX ADMIN — HYDROCODONE BITARTRATE AND ACETAMINOPHEN 1 TABLET: 5; 325 TABLET ORAL at 01:05

## 2018-02-15 RX ADMIN — DOCUSATE SODIUM 100 MG: 100 CAPSULE, LIQUID FILLED ORAL at 08:20

## 2018-02-15 RX ADMIN — LEVOTHYROXINE SODIUM 88 MCG: 88 TABLET ORAL at 05:47

## 2018-02-15 NOTE — DISCHARGE SUMMARY
[facility] Medicine Services  DISCHARGE SUMMARY    Patient Name: Malissa Child  : 1950  MRN: [id number]    Date of Admission: 2018  Date of Discharge: 2/15/2018  Primary Care Physician: Juan M Nieto MD    Consults     Date and Time Order Name Status Description    2018 2343 Inpatient Consult to Neurology Completed         Hospital Course     Presenting Problem:   Transient cerebral ischemia, unspecified type [G45.9]    Active Hospital Problems (** Indicates Principal Problem)    Diagnosis Date Noted   • **Transient cerebral ischemia [G45.9] 2018   • Recurrent major depressive disorder [F33.9] 2018   • ADELE on CPAP [G47.33, Z99.89] 2018   • Polycythemia [D75.1] 2018   • DM (diabetes mellitus), type 2, uncontrolled [E11.65] 2018   • Diabetic peripheral neuropathy associated with type 2 diabetes mellitus [E11.42] 2018   • History of atrial fibrillation without current medication [Z86.79] 2018   • Hyperlipidemia LDL goal <70 [E78.5] 2018   • Essential hypertension [I10] 2018   • Personal history of breast cancer [Z85.3] 2018   • Expressive aphasia [R47.01] 2018   • Right hemiparesis [G81.91] 2018   • Lumbar stenosis with neurogenic claudication [M48.062] 2017      Resolved Hospital Problems    Diagnosis Date Noted Date Resolved   No resolved problems to display.          Hospital Course:  Malissa Child is a 67 y.o. female who presented with expressive aphasia and right sided weakness found to have TIA. In addition she has chronic peripheral neuropathy.    TIA: 67 year old female presenting from home with aphasia and right sided weakness which resolved shortly after arrival. Patient underwent CT head, MRI brain, carotid imaging all of which was unremarkable. Echocardiogram was performed prior to discharge with results pending. Those can be followed up by her PCP at her follow up next week.  Neurology was consulted who recommended medical therapy with aspirin, lipitor, bp management, and glucose management. Her lisinopril was increased to 40mg which improved her bp. She was restarted on levemir in the hospital which imroved her glucose. She will resume her Lantus upon d/c. She can follow up with her pcp in 1 week.    Peripheral neuropathy: Unrelated to her reason for admission, she was noted to have severe (probably diabetic) peripheral neuropathy. PT recommended inpatient rehab and she was actually accepted by [facility] however patient declined due to personal reasons. She will continue with outpatient therapy as previously arranged by her PCP. AFO's were placed by PT prior to discharge.         Day of Discharge     HPI: Lying in bed. Tearful. Wants to go home.    Review of Systems  Gen- No fevers, chills  CV- No chest pain, palpitations  Resp- No cough, dyspnea  GI- No N/V/D, abd pain    Otherwise ROS is negative except as mentioned in the HPI.    Vital Signs:   Temp:  [97.7 °F (36.5 °C)-98.3 °F (36.8 °C)] 97.7 °F (36.5 °C)  Heart Rate:  [101-120] 120  Resp:  [16] 16  BP: (135-170)/() 135/92     Physical Exam:  Constitutional: No acute distress, awake, alert  HENT: NCAT, mucous membranes moist  Respiratory: Clear to auscultation bilaterally, respiratory effort normal   Cardiovascular: RRR, no murmurs, rubs, or gallops, palpable pedal pulses bilaterally  Gastrointestinal: Positive bowel sounds, soft, nontender, nondistended  Musculoskeletal: No bilateral ankle edema  Psychiatric: Appropriate affect, cooperative  Neurologic: Oriented x 3, 5/5 upper and lower extremity strength aside from decreased bilateral dorsiflexion of feet.  Skin: No rashes    Pertinent  and/or Most Recent Results       Results from last 7 days  Lab Units 02/15/18  0501 02/13/18  2351 02/13/18  1702   WBC 10*3/mm3 9.65  --  11.96*   HEMOGLOBIN g/dL 15.3  --  15.9*   HEMOGLOBIN, POC g/dL  --   --  16.3   HEMATOCRIT % 44.2*   --  44.4*   HEMATOCRIT POC %  --   --  48   PLATELETS 10*3/mm3 276  --  289   SODIUM mmol/L 135 137  --    POTASSIUM mmol/L 4.0 3.7  --    CHLORIDE mmol/L 100 102  --    CO2 mmol/L 30.0 22.0  --    BUN mg/dL 16 13  --    CREATININE mg/dL 0.60 0.60 0.50*   GLUCOSE mg/dL 193* 231*  --    CALCIUM mg/dL 9.1 8.9  --        Results from last 7 days  Lab Units 02/13/18  2351 02/13/18  1702   BILIRUBIN mg/dL 0.8  --    ALK PHOS U/L 97  --    ALT (SGPT) U/L 19  --    AST (SGOT) U/L 20  --    PROTIME Seconds 10.7 11.6*   INR  1.02 1.0   APTT seconds 26.4  --        Results from last 7 days  Lab Units 02/14/18  0458   CHOLESTEROL mg/dL 148   TRIGLYCERIDES mg/dL 237*   HDL CHOL mg/dL 34*       Results from last 7 days  Lab Units 02/14/18  1140 02/14/18  0458 02/13/18  2351   TSH mIU/mL  --   --  3.540   HEMOGLOBIN A1C %  --   --  13.30*   TROPONIN I ng/mL 0.018 0.021 0.015     Brief Urine Lab Results  (Last result in the past 365 days)      Color   Clarity   Blood   Leuk Est   Nitrite   Protein   CREAT   Urine HCG        02/14/18 0424 Yellow Clear Negative Negative Negative Negative               Microbiology Results Abnormal     None          Imaging Results (all)     Procedure Component Value Units Date/Time    MRI Brain Without Contrast [[id number]] Collected:  02/13/18 1835     Updated:  02/13/18 2049    Narrative:       EXAM:    MR Head Without Intravenous Contrast    CLINICAL HISTORY:    67 years old, female; Signs and symptoms; Weakness, extremity; Bilateral;   Patient HX: Hypertension. At 1400 the patient suddenly developed word finding   difficulties, the patient reported to ems that she felt weaker on the right   side. At the ed she states that she fell just under a week ago and hit her   right lower chest, hurting her ribcage. She said she did fall a few weeks ago   and did hit head no HX of surgery to head HX of breast cancer; Additional info:   Stroke    TECHNIQUE:    Magnetic resonance images of the head/brain  without intravenous contrast in   multiple planes.    COMPARISON:    CT ANGIOGRAM HEAD W WO CONTRAST 2018-02-13 17:01    FINDINGS:    There is no mass or mass effect.      There is no shift of midline structures.      There is ventricular prominence related to parenchymal volume loss.    The ventricular system is otherwise without acute change.      There is no evidence of an acute ischemic or hemorrhagic event.      There are patchy areas of increased T2 signal in the periventricular white   matter.      The gray/white margin is otherwise maintained.      There are no other areas of abnormal signal.      The sulci are prominent over both cerebral convexities.      There are no abnormal extra-axial fluid collections.      Impression:         Periventricular white matter changes consistent with chronic microvascular   ischemia.    Generalized cerebral atrophy.    Otherwise, no acute changes within the brain.    THIS DOCUMENT HAS BEEN ELECTRONICALLY SIGNED BY HEIDE FRENCH MD    CT Head Without Contrast Stroke Protocol [[id number]] Collected:  02/14/18 0849     Updated:  02/14/18 0914    Narrative:       EXAMINATION: CT HEAD WO CONTRAST STROKE PROTOCOL- 02/13/2018     INDICATION: Stroke         TECHNIQUE: CT scan of the head was performed at 5 mm intervals. No  intravenous contrast was utilized.     The radiation dose reduction device was turned on for each scan per the  ALARA (As Low as Reasonably Achievable) protocol.     COMPARISON: NONE     FINDINGS: There is no intracranial mass. There is no hemorrhage. There  is no midline shift or extra-axial fluid collection.       Impression:       Normal unenhanced CT scan of the head.     Time of examination: 1645.     Time report to ED personnel:  1657.     D:  02/13/2018  E:  02/14/2018        This report was finalized on 2/14/2018 9:11 AM by Dr. Pierre Goss MD.       XR Chest 1 View [[id number]] Collected:  02/14/18 0852     Updated:  02/14/18 0934    Narrative:        EXAMINATION: XR CHEST 1 VW-02/13/2018:      INDICATION: Stroke Protocol (onset > 12 hrs).      COMPARISON: NONE.     FINDINGS:  The lungs are clear. The heart is normal. There is mild  central granulomatous scarring.           Impression:       Negative portable chest for acute abnormality. There is no  aspiration, active disease or edema.     D:  02/13/2018  E:  02/14/2018     This report was finalized on 2/14/2018 9:32 AM by Dr. Chan Clemens MD.       CT Cerebral Perfusion With & Without Contrast [[id number]] Collected:  02/14/18 0852     Updated:  02/14/18 0935    Narrative:       EXAMINATION: CT CEREBRAL PERFUSION W WO CONTRAST- 02/13/2018     INDICATION: TIA, initial screening, right sided weakness, difficulty  speaking, aphasia, Code 19, change in neurologic status     TECHNIQUE: Cerebral perfusion analysis was performed using computed  tomography with contrast administration including post processing of  parametric maps with determination of cerebral blood flow, cerebral  blood volume, mean transit time and time to drain.     The radiation dose reduction device was turned on for each scan per the  ALARA (As Low as Reasonably Achievable) protocol.     COMPARISON: Compared to previous CT datasets of the brain performed  immediately prior to this study.     FINDINGS:   1. There is no evidence of evolving core infarct on the cerebral blood  flow datasets.  2. There is no discordance between cerebral blood flow and cerebral  blood volume parametric maps and no evidence of salvageable ischemic as  noted.  3. There is slow time to drain diffusely in the brain which may reflect  the clinical status but this is nonfocal.     There is no evidence of a delay in time to drain or in the main transit  time that is within a major vascular territory.       Impression:       1. Negative CT cerebral perfusion dataset for evolving core infarct,  salvageable ischemic, or acute ischemic insult within the major  vascular  territory.  2. Note is made of diffuse slow flow with slow time to drain and slow  mean transit time throughout all areas of the brain without affecting a  dominant major vascular territory.     D:  02/13/2018  E:  02/14/2018     This report was finalized on 2/14/2018 9:33 AM by Dr. Chan Clemens MD.       CT Angiogram Neck With & Without Contrast [[id number]] Collected:  02/14/18 0832     Updated:  02/14/18 0935    Narrative:       EXAMINATION: CT ANGIOGRAM HEAD W WO CONTRAST-, CT ANGIOGRAM NECK W WO  CONTRAST- 02/13/2018     INDICATION: Stroke , follow-up code 19, change in neurologic status,  right-sided weakness     TECHNIQUE:      CTA datasets were performed on the neck and head with intravenous  contrast enhancement.     2-D reconstructed reformatted images were formulated at the workstation  in the coronal and sagittal planes.     The radiation dose reduction device was turned on for each scan per the  ALARA (As Low as Reasonably Achievable) protocol.     COMPARISON: NONE     FINDINGS:   1. Transverse aortic arch is intact without dissection or plaque. Origin  of the great vessels is intact from the transverse arch.     Common carotid arteries are bilaterally normal patent and smooth. The  carotid bifurcations are normal patent and smooth. The internal carotid  arteries are normal patent and smooth.     2. The petrous and cavernous carotid arteries are normal patent and  smooth. Internal carotid terminus is bilaterally normal.  3. The intracranial vascularity is within normal limits diffusely and  bilaterally. There is no evidence of major branch occlusion, dissection,  high-grade stenotic plaque or significant loss of flow.  4. Vertebral arteries are patent in the neck. The right vertebral is  slightly nondominant, however, both vertebral arteries are patent to the  base of the basilar artery. The vertebral basilar confluence, basilar  artery basilar summit and posterior cerebral circulation is  within  normal limits.  5. Reconstructed 2-D datasets in the coronal sagittal and transaxial  planes are normal.       Impression:       1. Normal CTA datasets of the neck to include carotid and vertebral  circulation.  2. Normal CTA datasets of the brain.  3. No evidence of detectable vascular disease or acute vascular  abnormality is identified. There is no plaque, no dissection, no major  branch occlusion and no detectable ulcerated plaque disease.     D:  02/13/2018  E:  02/14/2018        This report was finalized on 2/14/2018 9:33 AM by Dr. Chan Clemens MD.       CT Angiogram Head With & Without Contrast [[id number]] Collected:  02/14/18 0832     Updated:  02/14/18 0935    Narrative:       EXAMINATION: CT ANGIOGRAM HEAD W WO CONTRAST-, CT ANGIOGRAM NECK W WO  CONTRAST- 02/13/2018     INDICATION: Stroke , follow-up code 19, change in neurologic status,  right-sided weakness     TECHNIQUE:      CTA datasets were performed on the neck and head with intravenous  contrast enhancement.     2-D reconstructed reformatted images were formulated at the workstation  in the coronal and sagittal planes.     The radiation dose reduction device was turned on for each scan per the  ALARA (As Low as Reasonably Achievable) protocol.     COMPARISON: NONE     FINDINGS:   1. Transverse aortic arch is intact without dissection or plaque. Origin  of the great vessels is intact from the transverse arch.     Common carotid arteries are bilaterally normal patent and smooth. The  carotid bifurcations are normal patent and smooth. The internal carotid  arteries are normal patent and smooth.     2. The petrous and cavernous carotid arteries are normal patent and  smooth. Internal carotid terminus is bilaterally normal.  3. The intracranial vascularity is within normal limits diffusely and  bilaterally. There is no evidence of major branch occlusion, dissection,  high-grade stenotic plaque or significant loss of flow.  4. Vertebral  arteries are patent in the neck. The right vertebral is  slightly nondominant, however, both vertebral arteries are patent to the  base of the basilar artery. The vertebral basilar confluence, basilar  artery basilar summit and posterior cerebral circulation is within  normal limits.  5. Reconstructed 2-D datasets in the coronal sagittal and transaxial  planes are normal.       Impression:       1. Normal CTA datasets of the neck to include carotid and vertebral  circulation.  2. Normal CTA datasets of the brain.  3. No evidence of detectable vascular disease or acute vascular  abnormality is identified. There is no plaque, no dissection, no major  branch occlusion and no detectable ulcerated plaque disease.     D:  02/13/2018  E:  02/14/2018        This report was finalized on 2/14/2018 9:33 AM by Dr. Chan Clemens MD.                              Discharge Details      Malissa Child   Home Medication Instructions LANRE:[id number]    Printed on:02/15/18 1040   Medication Information                      amitriptyline (ELAVIL) 25 MG tablet  Take 25 mg by mouth Every Night.             aspirin 325 MG tablet  Take 1 tablet by mouth Daily.             atorvastatin (LIPITOR) 80 MG tablet  Take 1 tablet by mouth Every Night.             exemestane (AROMASIN) 25 MG chemo tablet  Take 25 mg by mouth Daily.             FLUoxetine (PROzac) 10 MG capsule  Take 10 mg by mouth Daily.             glimepiride (AMARYL) 4 MG tablet  Take 4 mg by mouth Every Morning Before Breakfast.             HYDROcodone-acetaminophen (NORCO) 5-325 MG per tablet               Insulin Glargine (LANTUS SOLOSTAR) 100 UNIT/ML injection pen  Inject 20 Units under the skin Every Night.             JARDIANCE 25 MG tablet  Take 25 mg by mouth Daily.             levothyroxine (SYNTHROID) 88 MCG tablet  Take 88 mcg by mouth Daily.             lisinopril (PRINIVIL,ZESTRIL) 40 MG tablet  Take 1 tablet by mouth Daily.             metFORMIN (GLUCOPHAGE)  1000 MG tablet  Take 1,000 mg by mouth 2 (Two) Times a Day With Meals.             naproxen sodium (ALEVE) 220 MG tablet  Take 220 mg by mouth 2 (Two) Times a Day As Needed for Mild Pain .                   Discharge Disposition:  Home or Self Care    Discharge Diet: Cardiac      Discharge Activity: As tolerated      Future Appointments  Date Time Provider Department Center   2/16/2018 11:00 AM MILY [facility] None       Additional Instructions for the Follow-ups that You Need to Schedule     Discharge Follow-up with PCP    As directed    Follow Up Details:  1 week hospital follow up                     Time Spent on Discharge:  40 minutes    Electronically signed by Cris Melvin II, DO, 02/15/18, 10:40 AM.

## 2018-02-15 NOTE — THERAPY TREATMENT NOTE
Acute Care - Physical Therapy Treatment Note  [facility]     Patient Name: Malissa Child  : 1950  MRN: [id number]  Today's Date: 2/15/2018  Onset of Illness/Injury or Date of Surgery Date: 18  Date of Referral to PT: 18  Referring Physician:  DO Bambi    Admit Date: 2018    Visit Dx:    ICD-10-CM ICD-9-CM   1. Transient cerebral ischemia, unspecified type G45.9 435.9   2. Impaired mobility and ADLs Z74.09 799.89   3. Lumbar stenosis with neurogenic claudication M48.062 724.03   4. Diabetic peripheral neuropathy associated with type 2 diabetes mellitus E11.42 250.60     357.2     Patient Active Problem List   Diagnosis   • Lumbar stenosis with neurogenic claudication   • ADELE on CPAP   • Transient cerebral ischemia   • Polycythemia   • DM (diabetes mellitus), type 2, uncontrolled   • Diabetic peripheral neuropathy associated with type 2 diabetes mellitus   • History of atrial fibrillation without current medication   • Hyperlipidemia LDL goal <70   • Essential hypertension   • Personal history of breast cancer   • Expressive aphasia   • Right hemiparesis   • Recurrent major depressive disorder               Adult Rehabilitation Note       02/15/18 1050          Rehab Assessment/Intervention    Discipline physical therapist  -CD      Document Type therapy note (daily note)  -CD      Subjective Information agree to therapy;no complaints   DR TOLEDO AGREED TO ORDER FOR TRIAL OF B AFO'S.   -CD      Patient Effort, Rehab Treatment excellent  -CD      Symptoms Noted During/After Treatment none  -CD      Precautions/Limitations fall precautions   LE WEAKNESS. NEEDS R WALKER AT ALL TIMES. TRIAL OF AFO'S   -CD      Specific Treatment Considerations PT DECLINES H STAY DUE TO DOG AT HOME. AGREES TO HHPT WITH TRANSITION BACK TO OPPT WHEN APPROPRIATE. WILL BENEFIT FROM HOME SAFETY EVAL. PT EDUCATED IN USE OF B AFO'S TO DECREASE RISK OF FALLING DUE TO PERIPHERAL NEUROPATHY AND WEAK DF B. PT  EDUCATED IN IMPORTANCE OF GRADUAL INCREASE IN WEAR TIME AND FREQUENT SKIN CHECKS DUE TO ALTERED SENSATION AND H/O DM AND TO STOP WEARING IF SKIN INTEGRITY IS COMPROMISED AND TO SEEK MEDICAL ATTENTION. PT VERBALIZED UNDERSTANDING. IF SKIN ISSUES SHOULD ARISE, RECOMMEND PT SEEK ORDER FROM MD FOR CUSTOM AFO'S IF APPROPRIATE.   -CD      Recorded by [CD] Nasima Resendiz PT      Vital Signs    Pre Systolic BP Rehab --   VSS. NSG CLEARED FOR TREATMENT.   -CD      Recorded by [CD] Nasima Resendiz PT      Pain Assessment    Pain Assessment No/denies pain  -CD      Recorded by [CD] Nasima Resendiz PT      Cognitive Assessment/Intervention    Current Cognitive/Communication Assessment functional  -CD      Orientation Status oriented x 4  -CD      Follows Commands/Answers Questions 100% of the time  -CD      Personal Safety WNL/WFL   APPEARED MORE AWARE OF SAFETY HAZARDS AT HOME.   -CD      Personal Safety Interventions fall prevention program maintained;gait belt;muscle strengthening facilitated;nonskid shoes/slippers when out of bed;supervised activity  -CD      Recorded by [CD] Nasima Resendiz PT      Bed Mobility, Assessment/Treatment    Bed Mob, Supine to Sit, Saxis independent  -CD      Bed Mobility, Comment DONNED B AFO'S/SHOES SITTING EOB.   -CD      Recorded by [CD] Nasima Resendiz PT      Transfer Assessment/Treatment    Transfers, Sit-Stand Saxis supervision required;verbal cues required  -CD      Transfers, Stand-Sit Saxis supervision required;verbal cues required  -CD      Transfers, Sit-Stand-Sit, Assist Device rolling walker  -CD      Transfer, Comment CUES FOR HAND PLACEMENT.   -CD      Recorded by [CD] Nasima Resendiz PT      Gait Assessment/Treatment    Gait, [place] Level contact guard assist  -CD      Gait, Assistive Device rolling walker  -CD      Gait, Distance (Feet) 350  -CD      Gait, Gait Deviations jamie decreased;step length decreased   IMPROVED CLEARANCE OF TOES WITH AFO'S  SIGNIFICANTLY IMPROV  -CD      Gait, Comment IMPROVED CLEARANCE OF TOES WITH AFO'S SIGNIFICANTLY IMPROVING QUALITY OF GAIT. PT MUCH STEADIER. PT EDUCATED IN NEED FOR KNEE SOCKS TO WEAR UNDER AFO'S. RECOMMENDED R WALKER AT ALL TIMES AND REMOVAL OF PT'S THROW RUGS. PT TO SEEK ASSISTANCE FOR TAKING DOG OUT.   -CD      Recorded by [CD] Nasima Resendiz PT      Stairs Assessment/Treatment    Number of Stairs --   PT DECLINED NEED TO PRACTICE STAIRS.   -CD      Recorded by [CD] Nasima Resendiz PT      Therapy Exercises    Bilateral Lower Extremities AROM:;10 reps;sitting;ankle pumps/circles;hip flexion;LAQ  -CD      Recorded by [CD] Nasima Resendiz PT      Positioning and Restraints    Pre-Treatment Position in bed  -CD      Post Treatment Position chair  -CD      In Chair reclined;call light within reach;notified nsg;exit alarm on;encouraged to call for assist;legs elevated   REMOVED AFO'S AFTER GAIT IN [place]. SKIN CHECK COMPLETED.   -CD      Recorded by [CD] Nasima Resendiz PT        User Key  (r) = Recorded By, (t) = Taken By, (c) = Cosigned By    Initials Name Effective Dates    CD Nasima Resendiz PT 06/19/15 -                 IP PT Goals       02/15/18 1606 02/14/18 1028       Transfer Training PT LTG    Transfer Training PT LTG, Time to Achieve  2 wks  -CD     Transfer Training PT LTG, Activity Type  all transfers  -CD     Transfer Training PT LTG, [place] Level  independent  -CD     Transfer Training PT LTG, Assist Device  walker, rolling  -CD     Transfer Training PT LTG, Outcome goal ongoing  -CD      Gait Training PT LTG    Gait Training Goal PT LTG, Time to Achieve  2 wks  -CD     Gait Training Goal PT LTG, [place] Level  independent  -CD     Gait Training Goal PT LTG, Assist Device  walker, rolling  -CD     Gait Training Goal PT LTG, Distance to Achieve  600  -CD     Gait Training Goal PT LTG, Additional Goal  WITH B AFO'S IF TOLERATED.   -CD     Gait Training Goal PT LTG, Outcome goal ongoing  -CD       Stair Training PT LTG    Stair Training Goal PT LTG, Time to Achieve  2 wks  -CD     Stair Training Goal PT LTG, Number of Steps  3  -CD     Stair Training Goal PT LTG, [place] Level  independent  -CD     Stair Training Goal PT LTG, Assist Device  2 handrails  -CD     Stair Training Goal PT LTG, Outcome goal ongoing  -CD        User Key  (r) = Recorded By, (t) = Taken By, (c) = Cosigned By    Initials Name Provider Type    THAD Resendiz, PT Physical Therapist          Physical Therapy Education     Title: PT OT SLP Therapies (Active)     Topic: Physical Therapy (Done)     Point: Mobility training (Done)    Learning Progress Summary    Learner Readiness Method Response Comment Documented by Status   Patient Acceptance E VU,DU ULISES/DOFF AFO'S, SKIN CHECK, REC'S FOR HOME SAFETY, REC'S FOR D/C, PROGRESSION OF POC, D/C PLANNING.  02/15/18 1606 Done    Acceptance E,D VU,NR BENFITS OF OOB ACTIVITY, SAFETY WITH MOBILITY, GAIT TRAINING WITH R WALKER, BENEFITS OF TRIAL OF AFO'S DUE TO B DF WEAKNESS, REC'S FOR APPROPRIATE A.D. AT D/C. D/C PLANNING.  02/14/18 1026 Done               Point: Home exercise program (Done)    Learning Progress Summary    Learner Readiness Method Response Comment Documented by Status   Patient Acceptance E VU,DU ULISES/DOFF AFO'S, SKIN CHECK, REC'S FOR HOME SAFETY, REC'S FOR D/C, PROGRESSION OF POC, D/C PLANNING.  02/15/18 1606 Done    Acceptance E,D VU,NR BENFITS OF OOB ACTIVITY, SAFETY WITH MOBILITY, GAIT TRAINING WITH R WALKER, BENEFITS OF TRIAL OF AFO'S DUE TO B DF WEAKNESS, REC'S FOR APPROPRIATE A.D. AT D/C. D/C PLANNING.  02/14/18 1026 Done               Point: Body mechanics (Done)    Learning Progress Summary    Learner Readiness Method Response Comment Documented by Status   Patient Acceptance E VU,DU ULISES/DOFF AFO'S, SKIN CHECK, REC'S FOR HOME SAFETY, REC'S FOR D/C, PROGRESSION OF POC, D/C PLANNING.  02/15/18 1606 Done    Acceptance E,D VU,NR BENFITS OF OOB ACTIVITY, SAFETY  WITH MOBILITY, GAIT TRAINING WITH R WALKER, BENEFITS OF TRIAL OF AFO'S DUE TO B DF WEAKNESS, REC'S FOR APPROPRIATE A.D. AT D/C. D/C PLANNING.  02/14/18 1026 Done               Point: Precautions (Done)    Learning Progress Summary    Learner Readiness Method Response Comment Documented by Status   Patient Acceptance E VU,DU ULISES/DOFF AFO'S, SKIN CHECK, REC'S FOR HOME SAFETY, REC'S FOR D/C, PROGRESSION OF POC, D/C PLANNING.  02/15/18 1606 Done    Acceptance E,D VU,NR BENFITS OF OOB ACTIVITY, SAFETY WITH MOBILITY, GAIT TRAINING WITH R WALKER, BENEFITS OF TRIAL OF AFO'S DUE TO B DF WEAKNESS, REC'S FOR APPROPRIATE A.D. AT D/C. D/C PLANNING.  02/14/18 1026 Done                      User Key     Initials Effective Dates Name Provider Type Discipline     06/19/15 -  Nasima Resendiz, PT Physical Therapist PT                    PT Recommendation and Plan  Anticipated Equipment Needs At Discharge: tub bench, bedside commode  Anticipated Discharge Disposition: inpatient rehabilitation facility (P.T. RECOMMENDS IRF, IF PT WILL NOT AGREE, REC HHPT. )  Planned Therapy Interventions: balance training, gait training, strengthening, transfer training, stair training, home exercise program (TRIAL OF GAIT WITH B AFO'S. )  PT Frequency: daily  Plan of Care Review  Plan Of Care Reviewed With: patient  Progress: improving  Outcome Summary/Follow up Plan: TOLERATED AFO'S WELL WITH NOTABLE IMPROVEMENT IN QUALITY OF GAIT. RECOMMEND IRF BUT PT CHOOSING TO GO HOME DUE TO DOG. PT AGREES TO HHPT TO INCLUDE HOME SAFETY EVAL. PT AMBULATED 350 FEET WITH R WALKER AND B AFO'S.           Outcome Measures       02/15/18 1050 02/14/18 1026 02/14/18 0844    How much help from another person do you currently need...    Turning from your back to your side while in flat bed without using bedrails? 4  -CD  4  -CD    Moving from lying on back to sitting on the side of a flat bed without bedrails? 4  -CD  4  -CD    Moving to and from a bed to a chair  (including a wheelchair)? 3  -CD  3  -CD    Standing up from a chair using your arms (e.g., wheelchair, bedside chair)? 3  -CD  3  -CD    Climbing 3-5 steps with a railing? 3  -CD  2  -CD    To walk in hospital room? 3  -CD  3  -CD    AM-PAC 6 Clicks Score 20  -CD  19  -CD    How much help from another is currently needed...    Putting on and taking off regular lower body clothing?  3  -AN     Bathing (including washing, rinsing, and drying)  2  -AN     Toileting (which includes using toilet bed pan or urinal)  3  -AN     Putting on and taking off regular upper body clothing  3  -AN     Taking care of personal grooming (such as brushing teeth)  4  -AN     Eating meals  4  -AN     Score  19  -AN     Modified Joe Scale    Modified [place] Scale 3 - Moderate disability.  Requiring some help, but able to walk without assistance.   WITH AFO'S AND R WALKER.   -CD 3 - Moderate disability.  Requiring some help, but able to walk without assistance.  -AN 3 - Moderate disability.  Requiring some help, but able to walk without assistance.   WITH ROLLING WALKER.   -CD    Functional Assessment    Outcome Measure Options AM-PAC 6 Clicks Basic Mobility (PT);Modified [place]  -CD AM-PAC 6 Clicks Daily Activity (OT);Modified [place]  -AN AM-PAC 6 Clicks Basic Mobility (PT);Modified Joe  -CD      User Key  (r) = Recorded By, (t) = Taken By, (c) = Cosigned By    Initials Name Provider Type    THAD Resendzi, SANDY Physical Therapist    MAURO Veronica, OT Occupational Therapist           Time Calculation:         PT Charges       02/15/18 1610          Time Calculation    Start Time 1050  -CD      PT Received On 02/15/18  -CD      PT Goal Re-Cert Due Date 02/24/18  -CD      Time Calculation- PT    Total Timed Code Minutes- PT 38 minute(s)  -CD        User Key  (r) = Recorded By, (t) = Taken By, (c) = Cosigned By    Initials Name Provider Type    THAD Resendiz, SANDY Physical Therapist          Therapy Charges for Today     Code  Description Service Date Service Provider Modifiers Qty    17381898920 HC GAIT TRAINING EA 15 MIN 2/14/2018 Nasima Resendiz, PT GP 1    86566776510 HC PT EVAL LOW COMPLEXITY 4 2/14/2018 Nasima Resendiz, PT GP 1    63797946954 HC PT THER PROC EA 15 MIN 2/14/2018 Nasima Resendiz, PT GP 2    58330268416 HC GAIT TRAINING EA 15 MIN 2/15/2018 Nasima Resendiz, PT GP 1    70884882216 HC PT THER PROC EA 15 MIN 2/15/2018 Nasima Resendiz, PT GP 2          PT G-Codes  Outcome Measure Options: AM-PAC 6 Clicks Basic Mobility (PT), Modified Joe Resendiz, PT  2/15/2018

## 2018-02-15 NOTE — PLAN OF CARE
Problem: Patient Care Overview (Adult)  Goal: Plan of Care Review  Outcome: Ongoing (interventions implemented as appropriate)   02/15/18 1606   Patient Care Overview   Progress improving   Outcome Evaluation   Outcome Summary/Follow up Plan TOLERATED AFO'S WELL WITH NOTABLE IMPROVEMENT IN QUALITY OF GAIT. RECOMMEND IRF BUT PT CHOOSING TO GO HOME DUE TO DOG. PT AGREES TO HHPT TO INCLUDE HOME SAFETY EVAL. PT AMBULATED 350 FEET WITH R WALKER AND B AFO'S. RECOMMEND PT BE CLEARED BY MD FOR RETURN TO DRIVING GIVEN REPORTED VISION CHANGES AND AND WEAKNESS B DF.         Problem: Inpatient Physical Therapy  Goal: Transfer Training Goal 1 LTG- PT  Outcome: Ongoing (interventions implemented as appropriate)   02/14/18 1028 02/15/18 1606   Transfer Training PT LTG   Transfer Training PT LTG, Time to Achieve 2 wks --    Transfer Training PT LTG, Activity Type all transfers --    Transfer Training PT LTG, [place] Level independent --    Transfer Training PT LTG, Assist Device walker, rolling --    Transfer Training PT LTG, Outcome --  goal ongoing     Goal: Gait Training Goal LTG- PT  Outcome: Ongoing (interventions implemented as appropriate)   02/14/18 1028 02/15/18 1606   Gait Training PT LTG   Gait Training Goal PT LTG, Time to Achieve 2 wks --    Gait Training Goal PT LTG, [place] Level independent --    Gait Training Goal PT LTG, Assist Device walker, rolling --    Gait Training Goal PT LTG, Distance to Achieve 600 --    Gait Training Goal PT LTG, Additional Goal WITH B AFO'S IF TOLERATED.  --    Gait Training Goal PT LTG, Outcome --  goal ongoing     Goal: Stair Training Goal LTG- PT  Outcome: Ongoing (interventions implemented as appropriate)   02/14/18 1028 02/15/18 1606   Stair Training PT LTG   Stair Training Goal PT LTG, Time to Achieve 2 wks --    Stair Training Goal PT LTG, Number of Steps 3 --    Stair Training Goal PT LTG, [place] Level independent --    Stair Training Goal PT LTG, Assist Device 2  handrails --    Stair Training Goal PT LTG, Outcome --  goal ongoing

## 2018-02-21 ENCOUNTER — OFFICE VISIT (OUTPATIENT)
Dept: NEUROSURGERY | Facility: CLINIC | Age: 68
End: 2018-02-21

## 2018-02-21 VITALS — RESPIRATION RATE: 18 BRPM | OXYGEN SATURATION: 99 % | BODY MASS INDEX: 25.07 KG/M2 | HEIGHT: 66 IN | WEIGHT: 156 LBS

## 2018-02-21 DIAGNOSIS — M51.36 DEGENERATIVE DISC DISEASE, LUMBAR: ICD-10-CM

## 2018-02-21 DIAGNOSIS — R26.81 GAIT INSTABILITY: ICD-10-CM

## 2018-02-21 DIAGNOSIS — Z98.890 S/P LUMBAR LAMINECTOMY: Primary | ICD-10-CM

## 2018-02-21 DIAGNOSIS — M47.816 FACET ARTHRITIS OF LUMBAR REGION: ICD-10-CM

## 2018-02-21 DIAGNOSIS — M48.062 SPINAL STENOSIS OF LUMBAR REGION WITH NEUROGENIC CLAUDICATION: ICD-10-CM

## 2018-02-21 PROCEDURE — 99024 POSTOP FOLLOW-UP VISIT: CPT | Performed by: PHYSICIAN ASSISTANT

## 2018-02-21 RX ORDER — CYCLOBENZAPRINE HCL 10 MG
10 TABLET ORAL NIGHTLY PRN
Qty: 30 TABLET | Refills: 0 | Status: SHIPPED | OUTPATIENT
Start: 2018-02-21 | End: 2019-04-03

## 2018-02-21 RX ORDER — OXYCODONE HYDROCHLORIDE AND ACETAMINOPHEN 5; 325 MG/1; MG/1
TABLET ORAL
COMMUNITY
Start: 2018-02-03 | End: 2018-02-21

## 2018-02-21 NOTE — PROGRESS NOTES
Patient: Malissa Child  : 1950  Chart #: [id number]    Date of Service: 2018    CHIEF COMPLAINT: Bilateral hip and leg pain with walking and standing intolerance    History of Present Illness : Ms Child is seen in follow-up.  She is a 67-year-old former [place] carissa who on 2017 underwent an uncomplicated L4-5 laminectomy.  More remotely, she had a right L5-S1 disc herniation that was managed nonoperatively.  She has had some ups and downs since surgery.  At her first postoperative appointment she was doing pretty well with regard to her pain.  She had some persistent numbness in the right foot.  Shortly thereafter she fell at home and had some increased symptoms into her legs.  With time things settled down.  When she was last seen in our office she was complaining of right leg weakness and numbness.  That also has improved.  Last week she presented to the hospital with acute right-sided weakness and aphasia and was found to have TIA.  Her neurologic symptoms resolved shortly after her arrival to the hospital.  She is home now doing formal physical therapy.  She complains of pain right beneath her buttocks bilaterally.  This only bothers her at nighttime and is a new development within the last 2-3 weeks.  Walking around sometimes alleviates the pain as does sitting.  She has no back pain.  No numbness or pain further distally into her legs.    I reviewed the following portions of the patient's history and updated as appropriate: allergies, current medications, past family history, past medical history, past social history, past surgical history and problem list.        Review of Systems   Constitutional: Positive for fatigue. Negative for activity change, appetite change, chills, diaphoresis, fever and unexpected weight change.   HENT: Negative for congestion, dental problem, drooling, ear discharge, ear pain, facial swelling, hearing loss, mouth sores, nosebleeds, postnasal drip, rhinorrhea,  "sinus pressure, sneezing, sore throat, tinnitus, trouble swallowing and voice change.    Eyes: Negative for photophobia, pain, discharge, redness, itching and visual disturbance.   Respiratory: Negative for apnea, cough, choking, chest tightness, shortness of breath, wheezing and stridor.    Cardiovascular: Negative for chest pain, palpitations and leg swelling.   Gastrointestinal: Negative for abdominal distention, abdominal pain, anal bleeding, blood in stool, constipation, diarrhea, nausea, rectal pain and vomiting.   Endocrine: Negative for cold intolerance, heat intolerance, polydipsia, polyphagia and polyuria.   Genitourinary: Negative for decreased urine volume, difficulty urinating, dysuria, enuresis, flank pain, frequency, genital sores, hematuria and urgency.   Musculoskeletal: Positive for arthralgias and back pain. Negative for gait problem, joint swelling, myalgias, neck pain and neck stiffness.   Skin: Negative for color change, pallor, rash and wound.   Allergic/Immunologic: Negative for environmental allergies, food allergies and immunocompromised state.   Neurological: Negative for dizziness, tremors, seizures, syncope, facial asymmetry, speech difficulty, weakness, light-headedness, numbness and headaches.   Hematological: Negative for adenopathy. Does not bruise/bleed easily.   Psychiatric/Behavioral: Negative for agitation, behavioral problems, confusion, decreased concentration, dysphoric mood, hallucinations, self-injury, sleep disturbance and suicidal ideas. The patient is not nervous/anxious and is not hyperactive.    All other systems reviewed and are negative.      Objective   Vital Signs: Resp. rate 18, height 167.6 cm (66\"), weight 70.8 kg (156 lb), SpO2 99 %, not currently breastfeeding.  Physical Exam   Constitutional: She appears well-developed and well-nourished. No distress.   HENT:   Head: Normocephalic and atraumatic.   Eyes: EOM are normal. Pupils are equal, round, and reactive to " light.   Pulmonary/Chest: Breath sounds normal. No respiratory distress.   Skin:   Lumbar incision is well-healed   Psychiatric: Her behavior is normal. Thought content normal.   Mood is very gloomy    Nursing note and vitals reviewed.  Musculoskeletal:  Right foot dorsiflexion 4-/5.  Left foot dorsiflexion 4+/5.  All other fields grossly intact.  Muscle tone is normal throughout.  Station and gait are normal.  Straight leg raising is negative.  Jose sign negative  Neurologic:  Gait: Able to tandem walk without difficulty.  Coordination is intact.  Finger to nose, heel-to-shin, rapid alternating movements.  Deep tendon reflexes: Difficult to elicit patellar tendon and Achilles.  Sensation is intact to light touch throughout.  Patient is oriented to person, place, and time.    Assessment/Plan    Diagnosis: Lumbar stenosis with neurogenic claudication status post lumbar laminectomy    Medical Decision Making: Ms. Balderas is almost 3 months post-op.  She has had some setbacks during her recovery but overall I think she has been doing really well.  She complains of some pain in her buttock that only occurs at nighttime. We will have her continue with therapy for awhile to see if this helps. She may also try an anti-inflammatory or a muscle relaxer at bedtime.  She is scheduled to have some electrodiagnostic studies next week and we will see what that shows.  Otherwise we need to give this some more time.              Malissa was seen today for stroke f/u.    Diagnoses and all orders for this visit:    S/P lumbar laminectomy    Spinal stenosis of lumbar region with neurogenic claudication    Degenerative disc disease, lumbar    Gait instability    Facet arthritis of lumbar region    Other orders  -     cyclobenzaprine (FLEXERIL) 10 MG tablet; Take 1 tablet by mouth At Night PRN pain             Emperatriz Mendez PA-C  Patient Care Team:  Juan M Nieto MD as PCP - General  Juan M Nieto MD as Referring  Physician (Family Medicine)  Eric Neves MD as Consulting Physician (Hematology and Oncology)

## 2018-06-07 ENCOUNTER — TELEPHONE (OUTPATIENT)
Dept: NEUROSURGERY | Facility: CLINIC | Age: 68
End: 2018-06-07

## 2018-06-07 NOTE — TELEPHONE ENCOUNTER
Pt called and left a message that she wanted to come in for an appointment to determine why she keeps falling.  She has fallen twice already this week.  The first time she broke her toe.      Should the patient be scheduled with Quang or a PA?  Should she get any testing done prior to the visit?

## 2018-06-07 NOTE — TELEPHONE ENCOUNTER
I think we should check with Dr. Del Rio tomorrow to see if he would like to see her back with images or to see Camille first.     Thanks, Nancy     Please let patient know that once we touch base with Dr. Del Rio we will let her know.     CCimagalis Obrien on this! Thanks

## 2018-06-08 NOTE — TELEPHONE ENCOUNTER
I spoke with Dr. Del Rio. Please schedule an appointment with me at [facility]. She does not need any images prior to her office visit.    Thanks   - SO

## 2018-06-19 NOTE — TELEPHONE ENCOUNTER
Pt has seen Dr. Strauss's PA in the office.  They have ordered several labs and an MRI of her brain.  Pt has decided to follow with them to see what that gives her but if she needs to be seen here she will call, or the office will.

## 2019-04-03 ENCOUNTER — OFFICE VISIT (OUTPATIENT)
Dept: ORTHOPEDIC SURGERY | Facility: CLINIC | Age: 69
End: 2019-04-03

## 2019-04-03 VITALS — BODY MASS INDEX: 21.93 KG/M2 | OXYGEN SATURATION: 98 % | WEIGHT: 136.47 LBS | HEIGHT: 66 IN | HEART RATE: 92 BPM

## 2019-04-03 DIAGNOSIS — M17.12 PRIMARY OSTEOARTHRITIS OF LEFT KNEE: Primary | ICD-10-CM

## 2019-04-03 PROCEDURE — 99203 OFFICE O/P NEW LOW 30 MIN: CPT | Performed by: ORTHOPAEDIC SURGERY

## 2019-04-03 PROCEDURE — 20610 DRAIN/INJ JOINT/BURSA W/O US: CPT | Performed by: ORTHOPAEDIC SURGERY

## 2019-04-03 RX ORDER — OXYCODONE AND ACETAMINOPHEN 7.5; 325 MG/1; MG/1
TABLET ORAL
COMMUNITY
Start: 2019-03-04

## 2019-04-03 RX ORDER — IBUPROFEN 200 MG
200 TABLET ORAL EVERY 6 HOURS PRN
COMMUNITY

## 2019-04-03 RX ORDER — ROPIVACAINE HYDROCHLORIDE 5 MG/ML
4 INJECTION, SOLUTION EPIDURAL; INFILTRATION; PERINEURAL
Status: COMPLETED | OUTPATIENT
Start: 2019-04-03 | End: 2019-04-03

## 2019-04-03 RX ORDER — GABAPENTIN 600 MG/1
TABLET ORAL
COMMUNITY
Start: 2019-03-09

## 2019-04-03 RX ORDER — TRIAMCINOLONE ACETONIDE 40 MG/ML
40 INJECTION, SUSPENSION INTRA-ARTICULAR; INTRAMUSCULAR
Status: COMPLETED | OUTPATIENT
Start: 2019-04-03 | End: 2019-04-03

## 2019-04-03 RX ADMIN — TRIAMCINOLONE ACETONIDE 40 MG: 40 INJECTION, SUSPENSION INTRA-ARTICULAR; INTRAMUSCULAR at 14:31

## 2019-04-03 RX ADMIN — ROPIVACAINE HYDROCHLORIDE 4 ML: 5 INJECTION, SOLUTION EPIDURAL; INFILTRATION; PERINEURAL at 14:31

## 2019-04-03 NOTE — PROGRESS NOTES
Procedure   Large Joint Arthrocentesis: L knee  Date/Time: 4/3/2019 2:31 PM  Consent given by: patient  Site marked: site marked  Timeout: Immediately prior to procedure a time out was called to verify the correct patient, procedure, equipment, support staff and site/side marked as required   Supporting Documentation  Indications: pain   Procedure Details  Location: knee - L knee  Preparation: Patient was prepped and draped in the usual sterile fashion  Needle size: 22 G  Approach: anterolateral  Medications administered: 4 mL ropivacaine 0.5 %; 40 mg triamcinolone acetonide 40 MG/ML  Patient tolerance: patient tolerated the procedure well with no immediate complications

## 2019-04-03 NOTE — PROGRESS NOTES
[facility] [place] [facility] [place]    Subjective     Chief Complaint   Patient presents with   • Left Knee - Pain        HPI    Malissa Child is a 69 y.o. female.  She presents today for evaluation of left knee pain.  She has had pain for almost a year now, following a particular injury.  The pain is 3 out of 10 currently, aching in quality, and worsens with walking.  The pain is relatively unchanged over the past year.  She uses a walker for ambulation.  She has some balance issues.  No previous injections.      Patient Active Problem List   Diagnosis   • Lumbar stenosis with neurogenic claudication   • ADELE on CPAP   • Transient cerebral ischemia   • Polycythemia   • DM (diabetes mellitus), type 2, uncontrolled (CMS/HCC)   • Diabetic peripheral neuropathy associated with type 2 diabetes mellitus (CMS/HCC)   • History of atrial fibrillation without current medication   • Hyperlipidemia LDL goal <70   • Essential hypertension   • Personal history of breast cancer   • Expressive aphasia   • Right hemiparesis (CMS/HCC)   • Recurrent major depressive disorder (CMS/HCC)     Past Medical History:   Diagnosis Date   • Arthritis    • Back pain    • Breast cancer (CMS/HCC)     left breast, reoccurance times 3   • Depression    • Diabetes mellitus (CMS/HCC)     dx 2000- does not check fsbs   • Disease of thyroid gland    • DM (diabetes mellitus), type 2, uncontrolled (CMS/HCC) 2/13/2018   • History of atrial fibrillation    • History of atrial fibrillation without current medication 2/13/2018   • History of breast cancer    • Hyperlipidemia    • Hypertension    • Lumbar stenosis with neurogenic claudication 11/1/2017    Added automatically from request for surgery [id number]   • Sleep apnea with use of continuous positive airway pressure (CPAP)    • Wears eyeglasses    • Wears partial dentures       Past Surgical History:   Procedure Laterality Date   • APPENDECTOMY  2012   • BREAST LUMPECTOMY Left 1995   • BREAST  LUMPECTOMY Left 2012   • BREAST SURGERY Left 2014    mastectomy and reconstruction   • COLONOSCOPY      > 5 years ago   • COLONOSCOPY  2010?   • KNEE CARTILAGE SURGERY Left    • LUMBAR LAMINECTOMY DISCECTOMY DECOMPRESSION N/A 11/24/2017    Procedure: LUMBAR LAMINECTOMY L4-5;  Surgeon: Arnold Del Rio MD;  Location: [facility];  Service:    • MASTECTOMY Left       Family History   Problem Relation Age of Onset   • Breast cancer Mother    • Cancer Father    • Cancer Brother    • Pancreatitis Brother    • Cancer Maternal Grandmother    • Cancer Maternal Grandfather    • Dementia Paternal Grandmother    • Cancer Paternal Grandfather    • Lymphoma Brother      Social History     Socioeconomic History   • Marital status:      Spouse name: Not on file   • Number of children: Not on file   • Years of education: Not on file   • Highest education level: Not on file   Tobacco Use   • Smoking status: Never Smoker   • Smokeless tobacco: Never Used   Substance and Sexual Activity   • Alcohol use: Yes     Comment: occasion, less than once a month   • Drug use: No   • Sexual activity: Not Currently     Partners: Male     Comment:     Social History Narrative     in 2017, lives alone. 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       Current Outpatient Medications on File Prior to Visit   Medication Sig Dispense Refill   • aspirin 325 MG tablet Take 1 tablet by mouth Daily. 30 tablet 0   • atorvastatin (LIPITOR) 80 MG tablet Take 1 tablet by mouth Every Night. 30 tablet 0   • exemestane (AROMASIN) 25 MG chemo tablet Take 25 mg by mouth Daily.     • FLUoxetine (PROzac) 10 MG capsule Take 10 mg by mouth Daily.     • gabapentin (NEURONTIN) 600 MG tablet      • ibuprofen (ADVIL,MOTRIN) 200 MG tablet Take 200 mg by mouth Every 6 (Six) Hours As Needed for Mild Pain .     • levothyroxine (SYNTHROID) 88 MCG tablet Take 88 mcg by mouth Daily.     • lisinopril (PRINIVIL,ZESTRIL) 40 MG tablet Take  1 tablet by mouth Daily. 30 tablet 0   • metFORMIN (GLUCOPHAGE) 1000 MG tablet Take 1,000 mg by mouth 2 (Two) Times a Day With Meals.     • oxyCODONE-acetaminophen (PERCOCET) 7.5-325 MG per tablet      • [DISCONTINUED] amitriptyline (ELAVIL) 25 MG tablet Take 25 mg by mouth Every Night.     • [DISCONTINUED] cyclobenzaprine (FLEXERIL) 10 MG tablet Take 1 tablet by mouth At Night As Needed for Muscle Spasms. 30 tablet 0   • [DISCONTINUED] glimepiride (AMARYL) 4 MG tablet Take 4 mg by mouth Every Morning Before Breakfast.     • [DISCONTINUED] HYDROcodone-acetaminophen (NORCO) 5-325 MG per tablet      • [DISCONTINUED] Insulin Glargine (LANTUS SOLOSTAR) 100 UNIT/ML injection pen Inject 20 Units under the skin Every Night. 15 mL 0   • [DISCONTINUED] JARDIANCE 25 MG tablet Take 25 mg by mouth Daily.     • [DISCONTINUED] naproxen sodium (ALEVE) 220 MG tablet Take 220 mg by mouth 2 (Two) Times a Day As Needed for Mild Pain .       No current facility-administered medications on file prior to visit.       Allergies   Allergen Reactions   • Cat Hair Extract    • Dust Mite Extract    • Mold Extract [Trichophyton]         Review of Systems   Constitutional: Negative for activity change, appetite change, chills, diaphoresis, fatigue, fever and unexpected weight change.   HENT: Negative for congestion, dental problem, drooling, ear discharge, ear pain, facial swelling, hearing loss, mouth sores, nosebleeds, postnasal drip, rhinorrhea, sinus pressure, sneezing, sore throat, tinnitus, trouble swallowing and voice change.    Eyes: Negative for photophobia, pain, discharge, redness, itching and visual disturbance.   Respiratory: Positive for apnea. Negative for cough, choking, chest tightness, shortness of breath, wheezing and stridor.    Cardiovascular: Negative for chest pain, palpitations and leg swelling.   Gastrointestinal: Negative for abdominal distention, abdominal pain, anal bleeding, blood in stool, constipation, diarrhea,  "nausea, rectal pain and vomiting.   Endocrine: Negative for cold intolerance, heat intolerance, polydipsia, polyphagia and polyuria.   Genitourinary: Negative for decreased urine volume, difficulty urinating, dysuria, enuresis, flank pain, frequency, genital sores, hematuria and urgency.   Musculoskeletal: Positive for arthralgias. Negative for back pain, gait problem, joint swelling, myalgias, neck pain and neck stiffness.   Skin: Negative for color change, pallor, rash and wound.   Allergic/Immunologic: Negative for environmental allergies, food allergies and immunocompromised state.   Neurological: Positive for dizziness. Negative for tremors, seizures, syncope, facial asymmetry, speech difficulty, weakness, light-headedness, numbness and headaches.   Hematological: Negative for adenopathy. Does not bruise/bleed easily.   Psychiatric/Behavioral: Negative for agitation, behavioral problems, confusion, decreased concentration, dysphoric mood, hallucinations, self-injury, sleep disturbance and suicidal ideas. The patient is not nervous/anxious and is not hyperactive.         Objective      Physical Exam  Pulse 92   Ht 167.6 cm (65.98\")   Wt 61.9 kg (136 lb 7.4 oz)   SpO2 98%   BMI 22.04 kg/m²     Body mass index is 22.04 kg/m².    General:   Mental Status:  Alert   Appearance: Cooperative, in no acute distress   Build and Nutrition: Well-nourished well-developed female   Orientation: Alert and oriented to person, place and time   Posture: Normal   Gait: With a walker    Integument:   Left knee: No skin lesions, no rash, no ecchymosis    Neurologic:   Sensation:    Left foot: Intact to light touch on the dorsal and plantar aspect   Motor:  Left lower extremity: 5/5 quadriceps, hamstrings, ankle dorsiflexors, and ankle plantar flexors  Vascular:   Left lower extremity: 2+ dorsalis pedis pulse, prompt capillary refill    Lower Extremities:   Left Knee:    Tenderness:  Medial joint line tenderness    Effusion: "  None    Swelling:  None    Crepitus:  Positive    Atrophy:  None    Range of motion:  Extension: 0°       Flexion: 130°  Instability:  No varus laxity, no valgus laxity, negative anterior drawer  Deformities:  None      Imaging/Studies      Imaging Results (last 24 hours)     Procedure Component Value Units Date/Time    XR Knee 4+ View Left [[id number]] Resulted:  04/03/19 1420     Updated:  04/03/19 1421    Narrative:       Left Knee Radiographs  Indication: left knee pain  Views: Standing AP's and skiers of both knees, with lateral and sunrise   views of the left knee    Comparison: no prior studies available    Findings:   Bone-on-bone contact medial compartment, with both medial and lateral   osteophytes, with patellofemoral degeneration, consistent with advanced   arthritis.    Impression: Advanced left knee arthritis          Assessment and Plan     Malissa was seen today for pain.    Diagnoses and all orders for this visit:    Primary osteoarthritis of left knee  -     XR Knee 4+ View Left  -     Large Joint Arthrocentesis: L knee        1. Primary osteoarthritis of left knee        I reviewed my findings with the patient today.  She does have advanced left knee arthritis, and eventually may be a candidate for knee replacement surgery.  However, she is having balance issues, with an unclear etiology.  At this point, I would like to try an intra-articular injection, and this was provided.  I will see her back in 2 months, but sooner for any problems.    Of note, she had 50% improvement just a few minutes following the injection.    Return in about 2 months (around 6/3/2019).      Medical Decision Making  Management Options : prescription/IM medicine  Data/Risk: radiology tests and independent visualization of imaging, lab tests, or EMG/NCV      Drew Stanford MD  04/03/19  2:45 PM

## 2019-06-05 ENCOUNTER — OFFICE VISIT (OUTPATIENT)
Dept: ORTHOPEDIC SURGERY | Facility: CLINIC | Age: 69
End: 2019-06-05

## 2019-06-05 VITALS — HEIGHT: 66 IN | HEART RATE: 100 BPM | OXYGEN SATURATION: 98 % | BODY MASS INDEX: 21.68 KG/M2 | WEIGHT: 134.92 LBS

## 2019-06-05 DIAGNOSIS — M17.12 PRIMARY OSTEOARTHRITIS OF LEFT KNEE: Primary | ICD-10-CM

## 2019-06-05 PROCEDURE — 99212 OFFICE O/P EST SF 10 MIN: CPT | Performed by: ORTHOPAEDIC SURGERY

## 2019-06-05 NOTE — PROGRESS NOTES
[facility] – [place] [facility] Note    Subjective     Chief Complaint   Patient presents with   • Left Knee - Follow-up     2 month        HPI    Malissa Child is a 69 y.o. female.  She follows up today for left knee.  She did have good relief with the intra-articular injection on her last visit, and has minimal pain today.  Knee is tolerable.  She continues to walk with a walker secondary to balance issues.      Patient Active Problem List   Diagnosis   • Lumbar stenosis with neurogenic claudication   • ADELE on CPAP   • Transient cerebral ischemia   • Polycythemia   • DM (diabetes mellitus), type 2, uncontrolled (CMS/HCC)   • Diabetic peripheral neuropathy associated with type 2 diabetes mellitus (CMS/HCC)   • History of atrial fibrillation without current medication   • Hyperlipidemia LDL goal <70   • Essential hypertension   • Personal history of breast cancer   • Expressive aphasia   • Right hemiparesis (CMS/HCC)   • Recurrent major depressive disorder (CMS/HCC)     Past Medical History:   Diagnosis Date   • Arthritis    • Back pain    • Breast cancer (CMS/HCC)     left breast, reoccurance times 3   • Depression    • Diabetes mellitus (CMS/HCC)     dx 2000- does not check fsbs   • Disease of thyroid gland    • DM (diabetes mellitus), type 2, uncontrolled (CMS/HCC) 2/13/2018   • History of atrial fibrillation    • History of atrial fibrillation without current medication 2/13/2018   • History of breast cancer    • Hyperlipidemia    • Hypertension    • Lumbar stenosis with neurogenic claudication 11/1/2017    Added automatically from request for surgery [id number]   • Sleep apnea with use of continuous positive airway pressure (CPAP)    • Wears eyeglasses    • Wears partial dentures       Past Surgical History:   Procedure Laterality Date   • APPENDECTOMY  2012   • BREAST LUMPECTOMY Left 1995   • BREAST LUMPECTOMY Left 2012   • BREAST SURGERY Left 2014    mastectomy and reconstruction   • COLONOSCOPY      > 5  years ago   • COLONOSCOPY  2010?   • KNEE CARTILAGE SURGERY Left    • LUMBAR LAMINECTOMY DISCECTOMY DECOMPRESSION N/A 11/24/2017    Procedure: LUMBAR LAMINECTOMY L4-5;  Surgeon: Arnold Del Rio MD;  Location: [facility];  Service:    • MASTECTOMY Left       Family History   Problem Relation Age of Onset   • Breast cancer Mother    • Cancer Father    • Cancer Brother    • Pancreatitis Brother    • Cancer Maternal Grandmother    • Cancer Maternal Grandfather    • Dementia Paternal Grandmother    • Cancer Paternal Grandfather    • Lymphoma Brother      Social History     Socioeconomic History   • Marital status:      Spouse name: Not on file   • Number of children: Not on file   • Years of education: Not on file   • Highest education level: Not on file   Tobacco Use   • Smoking status: Never Smoker   • Smokeless tobacco: Never Used   Substance and Sexual Activity   • Alcohol use: Yes     Comment: occasion, less than once a month   • Drug use: No   • Sexual activity: Not Currently     Partners: Male     Comment:     Social History Narrative     in 2017, lives alone. 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       Current Outpatient Medications on File Prior to Visit   Medication Sig Dispense Refill   • aspirin 325 MG tablet Take 1 tablet by mouth Daily. 30 tablet 0   • atorvastatin (LIPITOR) 80 MG tablet Take 1 tablet by mouth Every Night. 30 tablet 0   • exemestane (AROMASIN) 25 MG chemo tablet Take 25 mg by mouth Daily.     • FLUoxetine (PROzac) 10 MG capsule Take 10 mg by mouth Daily.     • gabapentin (NEURONTIN) 600 MG tablet      • ibuprofen (ADVIL,MOTRIN) 200 MG tablet Take 200 mg by mouth Every 6 (Six) Hours As Needed for Mild Pain .     • levothyroxine (SYNTHROID) 88 MCG tablet Take 88 mcg by mouth Daily.     • lisinopril (PRINIVIL,ZESTRIL) 40 MG tablet Take 1 tablet by mouth Daily. 30 tablet 0   • metFORMIN (GLUCOPHAGE) 1000 MG tablet Take 1,000 mg by mouth 2  (Two) Times a Day With Meals.     • oxyCODONE-acetaminophen (PERCOCET) 7.5-325 MG per tablet        No current facility-administered medications on file prior to visit.       Allergies   Allergen Reactions   • Cat Hair Extract    • Dust Mite Extract    • Mold Extract [Trichophyton]         Review of Systems   Constitutional: Negative.  Negative for activity change, appetite change, chills, diaphoresis, fatigue, fever and unexpected weight change.   HENT: Negative.  Negative for congestion, dental problem, drooling, ear discharge, ear pain, facial swelling, hearing loss, mouth sores, nosebleeds, postnasal drip, rhinorrhea, sinus pressure, sneezing, sore throat, tinnitus, trouble swallowing and voice change.    Eyes: Negative.  Negative for photophobia, pain, discharge, redness, itching and visual disturbance.   Respiratory: Negative.  Negative for apnea, cough, choking, chest tightness, shortness of breath, wheezing and stridor.    Cardiovascular: Negative.  Negative for chest pain, palpitations and leg swelling.   Gastrointestinal: Negative.  Negative for abdominal distention, abdominal pain, anal bleeding, blood in stool, constipation, diarrhea, nausea, rectal pain and vomiting.   Endocrine: Negative.  Negative for cold intolerance, heat intolerance, polydipsia, polyphagia and polyuria.   Genitourinary: Negative.  Negative for decreased urine volume, difficulty urinating, dysuria, enuresis, flank pain, frequency, genital sores, hematuria and urgency.   Musculoskeletal: Positive for joint swelling. Negative for arthralgias, back pain, gait problem, myalgias, neck pain and neck stiffness.   Skin: Negative.  Negative for color change, pallor, rash and wound.   Allergic/Immunologic: Negative.  Negative for environmental allergies, food allergies and immunocompromised state.   Neurological: Positive for dizziness. Negative for tremors, seizures, syncope, facial asymmetry, speech difficulty, weakness, light-headedness,  "numbness and headaches.   Hematological: Negative.  Negative for adenopathy. Does not bruise/bleed easily.   Psychiatric/Behavioral: Positive for agitation. Negative for behavioral problems, confusion, decreased concentration, dysphoric mood, hallucinations, self-injury, sleep disturbance and suicidal ideas. The patient is not nervous/anxious and is not hyperactive.         Objective      Physical Exam  Pulse 100   Ht 167.6 cm (65.98\")   Wt 61.2 kg (134 lb 14.7 oz)   SpO2 98%   BMI 21.79 kg/m²     Body mass index is 21.79 kg/m².    General:   Mental Status:  Alert   Appearance: Cooperative, in no acute distress   Build and Nutrition: Well-nourished well-developed female   Orientation: Alert and oriented to person, place and time   Posture: Normal   Gait: With a walker    Integument:   Left knee: No skin lesions, no rash, no ecchymosis    Lower Extremities:   Left Knee:    Tenderness:  Medial joint line tenderness    Effusion:  None    Swelling:  None    Crepitus: Positive    Range of motion:  Extension: 0°       Flexion: 120°  Instability: No varus laxity, no valgus laxity, negative anterior drawer  Deformities:  None        Assessment and Plan     Malissa was seen today for follow-up.    Diagnoses and all orders for this visit:    Primary osteoarthritis of left knee        1. Primary osteoarthritis of left knee        Reviewed my findings with patient today.  Her left knee is improved following the injection, and I will see her back for any worsening or problems.  Repeat injection can be considered.    Return if symptoms worsen or fail to improve.        Drew Stanford MD  06/12/19  2:01 PM  "

## 2022-08-08 NOTE — PLAN OF CARE
Problem: Patient Care Overview (Adult)  Goal: Plan of Care Review  Outcome: Ongoing (interventions implemented as appropriate)      Problem: Fall Risk (Adult)  Goal: Identify Related Risk Factors and Signs and Symptoms  Outcome: Ongoing (interventions implemented as appropriate)    Goal: Absence of Falls  Outcome: Ongoing (interventions implemented as appropriate)         Selama Booster : 2[birth year]

## (undated) DEVICE — AIRWY 90MM NO9

## (undated) DEVICE — NEURO SPONGES: Brand: DEROYAL

## (undated) DEVICE — ENCORE® LATEX MICRO SIZE 7.5, STERILE LATEX POWDER-FREE SURGICAL GLOVE: Brand: ENCORE

## (undated) DEVICE — SUT ETHLN 3/0 FS1 30IN 669H

## (undated) DEVICE — SUT VIC PLS CTD ANTIB BR 3/0 8/18IN 45CM

## (undated) DEVICE — CANNULA,OXY,ADULT,SUPERSOFT,W/7'TUB,UC: Brand: MEDLINE

## (undated) DEVICE — ENCORE® LATEX MICRO SIZE 7, STERILE LATEX POWDER-FREE SURGICAL GLOVE: Brand: ENCORE

## (undated) DEVICE — MEDI-VAC NON-CONDUCTIVE SUCTION TUBING: Brand: CARDINAL HEALTH

## (undated) DEVICE — JACKSON-PRATT 100CC BULB RESERVOIR: Brand: CARDINAL HEALTH

## (undated) DEVICE — C-ARM DRAPE: Brand: DEROYAL

## (undated) DEVICE — SOL LR 1000ML

## (undated) DEVICE — ELECTRD BLD EXT EDGE/INSUL 1P 4IN

## (undated) DEVICE — DRSNG WND BORDR/ADHS NONADHR/GZ LF 4X4IN STRL

## (undated) DEVICE — JP PERF DRN SIL FLT 7MM FULL: Brand: CARDINAL HEALTH

## (undated) DEVICE — SYS SKIN CLS DERMABOND PRINEO W/22CM MESH TP

## (undated) DEVICE — ACCY PA700 LUBRICANT DIFFUSER MR7 4 PACK: Brand: MIDAS REX

## (undated) DEVICE — MEDI-VAC YANKAUER SUCTION HANDLE W/BULBOUS TIP: Brand: CARDINAL HEALTH

## (undated) DEVICE — ENCORE® LATEX MICRO SIZE 6.5, STERILE LATEX POWDER-FREE SURGICAL GLOVE: Brand: ENCORE

## (undated) DEVICE — ANTIBACTERIAL UNDYED BRAIDED (POLYGLACTIN 910), SYNTHETIC ABSORBABLE SUTURE: Brand: COATED VICRYL

## (undated) DEVICE — PK NEURO DISC 10

## (undated) DEVICE — TOOL 14MH30 LEGEND 14CM 3MM: Brand: MIDAS REX ™